# Patient Record
Sex: FEMALE | Race: WHITE | Employment: UNEMPLOYED | URBAN - METROPOLITAN AREA
[De-identification: names, ages, dates, MRNs, and addresses within clinical notes are randomized per-mention and may not be internally consistent; named-entity substitution may affect disease eponyms.]

---

## 2020-12-11 ENCOUNTER — HOSPITAL ENCOUNTER (OUTPATIENT)
Dept: PREADMISSION TESTING | Age: 75
Discharge: HOME | End: 2020-12-15
Payer: MEDICARE

## 2020-12-11 ENCOUNTER — OFFICE VISIT (OUTPATIENT)
Dept: PRIMARY CARE CLINIC | Age: 75
End: 2020-12-11
Payer: MEDICARE

## 2020-12-11 LAB
ABO/RH: NORMAL
ANION GAP SERPL CALCULATED.3IONS-SCNC: 13 MMOL/L (ref 3–16)
ANTIBODY SCREEN: NORMAL
APTT: 31.2 SEC (ref 24.2–36.2)
BASOPHILS ABSOLUTE: 0 K/UL (ref 0–0.2)
BASOPHILS RELATIVE PERCENT: 0.3 %
BILIRUBIN URINE: NEGATIVE
BLOOD, URINE: ABNORMAL
BUN BLDV-MCNC: 22 MG/DL (ref 7–20)
CALCIUM SERPL-MCNC: 10.3 MG/DL (ref 8.3–10.6)
CHLORIDE BLD-SCNC: 102 MMOL/L (ref 99–110)
CLARITY: ABNORMAL
CO2: 24 MMOL/L (ref 21–32)
COLOR: YELLOW
CREAT SERPL-MCNC: 1.8 MG/DL (ref 0.6–1.2)
EKG ATRIAL RATE: 78 BPM
EKG DIAGNOSIS: NORMAL
EKG P AXIS: 47 DEGREES
EKG P-R INTERVAL: 184 MS
EKG Q-T INTERVAL: 362 MS
EKG QRS DURATION: 70 MS
EKG QTC CALCULATION (BAZETT): 412 MS
EKG R AXIS: 66 DEGREES
EKG T AXIS: 59 DEGREES
EKG VENTRICULAR RATE: 78 BPM
EOSINOPHILS ABSOLUTE: 0 K/UL (ref 0–0.6)
EOSINOPHILS RELATIVE PERCENT: 0.7 %
EPITHELIAL CELLS, UA: 1 /HPF (ref 0–5)
GFR AFRICAN AMERICAN: 33
GFR NON-AFRICAN AMERICAN: 27
GLUCOSE BLD-MCNC: 109 MG/DL (ref 70–99)
GLUCOSE URINE: NEGATIVE MG/DL
HCT VFR BLD CALC: 36.6 % (ref 36–48)
HEMOGLOBIN: 11.8 G/DL (ref 12–16)
HYALINE CASTS: 1 /LPF (ref 0–8)
INR BLD: 0.97 (ref 0.86–1.14)
KETONES, URINE: NEGATIVE MG/DL
LEUKOCYTE ESTERASE, URINE: ABNORMAL
LYMPHOCYTES ABSOLUTE: 0.5 K/UL (ref 1–5.1)
LYMPHOCYTES RELATIVE PERCENT: 8.5 %
MCH RBC QN AUTO: 28.1 PG (ref 26–34)
MCHC RBC AUTO-ENTMCNC: 32.3 G/DL (ref 31–36)
MCV RBC AUTO: 87.1 FL (ref 80–100)
MICROSCOPIC EXAMINATION: YES
MONOCYTES ABSOLUTE: 0.3 K/UL (ref 0–1.3)
MONOCYTES RELATIVE PERCENT: 4.6 %
NEUTROPHILS ABSOLUTE: 5.2 K/UL (ref 1.7–7.7)
NEUTROPHILS RELATIVE PERCENT: 85.9 %
NITRITE, URINE: NEGATIVE
PDW BLD-RTO: 13.7 % (ref 12.4–15.4)
PH UA: 7.5 (ref 5–8)
PLATELET # BLD: 212 K/UL (ref 135–450)
PMV BLD AUTO: 8.7 FL (ref 5–10.5)
POTASSIUM SERPL-SCNC: 4.4 MMOL/L (ref 3.5–5.1)
PROTEIN UA: 30 MG/DL
PROTHROMBIN TIME: 11.2 SEC (ref 10–13.2)
RBC # BLD: 4.2 M/UL (ref 4–5.2)
RBC UA: 25 /HPF (ref 0–4)
SEDIMENTATION RATE, ERYTHROCYTE: 14 MM/HR (ref 0–30)
SODIUM BLD-SCNC: 139 MMOL/L (ref 136–145)
SPECIFIC GRAVITY UA: 1.02 (ref 1–1.03)
URINE REFLEX TO CULTURE: YES
URINE TYPE: ABNORMAL
UROBILINOGEN, URINE: 0.2 E.U./DL
WBC # BLD: 6.1 K/UL (ref 4–11)
WBC UA: 351 /HPF (ref 0–5)

## 2020-12-11 PROCEDURE — 85730 THROMBOPLASTIN TIME PARTIAL: CPT

## 2020-12-11 PROCEDURE — G8428 CUR MEDS NOT DOCUMENT: HCPCS | Performed by: NURSE PRACTITIONER

## 2020-12-11 PROCEDURE — 99211 OFF/OP EST MAY X REQ PHY/QHP: CPT | Performed by: NURSE PRACTITIONER

## 2020-12-11 PROCEDURE — 87641 MR-STAPH DNA AMP PROBE: CPT

## 2020-12-11 PROCEDURE — 93010 ELECTROCARDIOGRAM REPORT: CPT | Performed by: INTERNAL MEDICINE

## 2020-12-11 PROCEDURE — G8421 BMI NOT CALCULATED: HCPCS | Performed by: NURSE PRACTITIONER

## 2020-12-11 PROCEDURE — 86850 RBC ANTIBODY SCREEN: CPT

## 2020-12-11 PROCEDURE — 93005 ELECTROCARDIOGRAM TRACING: CPT | Performed by: NEUROLOGICAL SURGERY

## 2020-12-11 PROCEDURE — 80048 BASIC METABOLIC PNL TOTAL CA: CPT

## 2020-12-11 PROCEDURE — 86901 BLOOD TYPING SEROLOGIC RH(D): CPT

## 2020-12-11 PROCEDURE — 85610 PROTHROMBIN TIME: CPT

## 2020-12-11 PROCEDURE — 87086 URINE CULTURE/COLONY COUNT: CPT

## 2020-12-11 PROCEDURE — 85025 COMPLETE CBC W/AUTO DIFF WBC: CPT

## 2020-12-11 PROCEDURE — 81001 URINALYSIS AUTO W/SCOPE: CPT

## 2020-12-11 PROCEDURE — 85652 RBC SED RATE AUTOMATED: CPT

## 2020-12-11 PROCEDURE — 86900 BLOOD TYPING SEROLOGIC ABO: CPT

## 2020-12-11 NOTE — PROGRESS NOTES
Patient presented to [facility] drive up clinic for preop testing. Patient was swabbed and given information advising them to remain isolated until procedure date.

## 2020-12-12 LAB
MRSA SCREEN RT-PCR: NORMAL
SARS-COV-2, NAA: NOT DETECTED
URINE CULTURE, ROUTINE: NORMAL

## 2021-01-08 ENCOUNTER — HOSPITAL ENCOUNTER (OUTPATIENT)
Dept: PREADMISSION TESTING | Age: 76
Discharge: HOME | End: 2021-01-12
Payer: MEDICARE

## 2021-01-08 ENCOUNTER — OFFICE VISIT (OUTPATIENT)
Dept: PRIMARY CARE CLINIC | Age: 76
End: 2021-01-08
Payer: MEDICARE

## 2021-01-08 DIAGNOSIS — Z01.812 PRE-PROCEDURAL LABORATORY EXAMINATIONS: Primary | ICD-10-CM

## 2021-01-08 LAB
ABO/RH: NORMAL
ANION GAP SERPL CALCULATED.3IONS-SCNC: 13 MMOL/L (ref 3–16)
ANTIBODY SCREEN: NORMAL
APTT: 29.6 SEC (ref 24.2–36.2)
BACTERIA: ABNORMAL /HPF
BASOPHILS ABSOLUTE: 0 K/UL (ref 0–0.2)
BASOPHILS RELATIVE PERCENT: 0.3 %
BILIRUBIN URINE: NEGATIVE
BLOOD, URINE: ABNORMAL
BUN BLDV-MCNC: 24 MG/DL (ref 7–20)
CALCIUM SERPL-MCNC: 10.5 MG/DL (ref 8.3–10.6)
CHLORIDE BLD-SCNC: 102 MMOL/L (ref 99–110)
CLARITY: ABNORMAL
CO2: 26 MMOL/L (ref 21–32)
COLOR: YELLOW
CREAT SERPL-MCNC: 1.6 MG/DL (ref 0.6–1.2)
EOSINOPHILS ABSOLUTE: 0 K/UL (ref 0–0.6)
EOSINOPHILS RELATIVE PERCENT: 0.8 %
EPITHELIAL CELLS, UA: 4 /HPF (ref 0–5)
GFR AFRICAN AMERICAN: 38
GFR NON-AFRICAN AMERICAN: 31
GLUCOSE BLD-MCNC: 98 MG/DL (ref 70–99)
GLUCOSE URINE: NEGATIVE MG/DL
HCT VFR BLD CALC: 34.6 % (ref 36–48)
HEMOGLOBIN: 11.3 G/DL (ref 12–16)
HYALINE CASTS: 1 /LPF (ref 0–8)
INR BLD: 0.94 (ref 0.86–1.14)
KETONES, URINE: NEGATIVE MG/DL
LEUKOCYTE ESTERASE, URINE: ABNORMAL
LYMPHOCYTES ABSOLUTE: 0.5 K/UL (ref 1–5.1)
LYMPHOCYTES RELATIVE PERCENT: 8.3 %
MCH RBC QN AUTO: 28.2 PG (ref 26–34)
MCHC RBC AUTO-ENTMCNC: 32.7 G/DL (ref 31–36)
MCV RBC AUTO: 86.3 FL (ref 80–100)
MICROSCOPIC EXAMINATION: YES
MONOCYTES ABSOLUTE: 0.3 K/UL (ref 0–1.3)
MONOCYTES RELATIVE PERCENT: 5.4 %
MRSA SCREEN RT-PCR: NORMAL
NEUTROPHILS ABSOLUTE: 5 K/UL (ref 1.7–7.7)
NEUTROPHILS RELATIVE PERCENT: 85.2 %
NITRITE, URINE: NEGATIVE
PDW BLD-RTO: 13.7 % (ref 12.4–15.4)
PH UA: 6.5 (ref 5–8)
PLATELET # BLD: 227 K/UL (ref 135–450)
PMV BLD AUTO: 8.3 FL (ref 5–10.5)
POTASSIUM SERPL-SCNC: 4.5 MMOL/L (ref 3.5–5.1)
PROTEIN UA: 30 MG/DL
PROTHROMBIN TIME: 10.9 SEC (ref 10–13.2)
RBC # BLD: 4.01 M/UL (ref 4–5.2)
RBC UA: 8 /HPF (ref 0–4)
SEDIMENTATION RATE, ERYTHROCYTE: 16 MM/HR (ref 0–30)
SODIUM BLD-SCNC: 141 MMOL/L (ref 136–145)
SPECIFIC GRAVITY UA: 1.01 (ref 1–1.03)
URINE REFLEX TO CULTURE: YES
URINE TYPE: ABNORMAL
UROBILINOGEN, URINE: 0.2 E.U./DL
WBC # BLD: 5.8 K/UL (ref 4–11)
WBC UA: 520 /HPF (ref 0–5)

## 2021-01-08 PROCEDURE — G8428 CUR MEDS NOT DOCUMENT: HCPCS | Performed by: NURSE PRACTITIONER

## 2021-01-08 PROCEDURE — 85730 THROMBOPLASTIN TIME PARTIAL: CPT

## 2021-01-08 PROCEDURE — G8421 BMI NOT CALCULATED: HCPCS | Performed by: NURSE PRACTITIONER

## 2021-01-08 PROCEDURE — 87086 URINE CULTURE/COLONY COUNT: CPT

## 2021-01-08 PROCEDURE — 87641 MR-STAPH DNA AMP PROBE: CPT

## 2021-01-08 PROCEDURE — 86850 RBC ANTIBODY SCREEN: CPT

## 2021-01-08 PROCEDURE — 85610 PROTHROMBIN TIME: CPT

## 2021-01-08 PROCEDURE — 80048 BASIC METABOLIC PNL TOTAL CA: CPT

## 2021-01-08 PROCEDURE — 86901 BLOOD TYPING SEROLOGIC RH(D): CPT

## 2021-01-08 PROCEDURE — 86900 BLOOD TYPING SEROLOGIC ABO: CPT

## 2021-01-08 PROCEDURE — 85652 RBC SED RATE AUTOMATED: CPT

## 2021-01-08 PROCEDURE — 85025 COMPLETE CBC W/AUTO DIFF WBC: CPT

## 2021-01-08 PROCEDURE — 99211 OFF/OP EST MAY X REQ PHY/QHP: CPT | Performed by: NURSE PRACTITIONER

## 2021-01-08 PROCEDURE — 81001 URINALYSIS AUTO W/SCOPE: CPT

## 2021-01-09 LAB
SARS-COV-2: NOT DETECTED
URINE CULTURE, ROUTINE: NORMAL

## 2021-01-12 RX ORDER — QUETIAPINE FUMARATE 25 MG/1
25 TABLET, FILM COATED ORAL DAILY
COMMUNITY

## 2021-01-12 RX ORDER — OMEGA-3 FATTY ACIDS CAP DELAYED RELEASE 1000 MG 1000 MG
3000 CAPSULE DELAYED RELEASE ORAL 4 TIMES DAILY
Status: ON HOLD | COMMUNITY
End: 2021-01-17 | Stop reason: HOSPADM

## 2021-01-12 RX ORDER — ATORVASTATIN CALCIUM 10 MG/1
10 TABLET, FILM COATED ORAL DAILY
COMMUNITY

## 2021-01-12 RX ORDER — FAMOTIDINE 20 MG/1
20 TABLET, FILM COATED ORAL 2 TIMES DAILY
COMMUNITY

## 2021-01-12 RX ORDER — FLUOXETINE 10 MG/1
10 TABLET, FILM COATED ORAL 3 TIMES DAILY
COMMUNITY

## 2021-01-12 RX ORDER — TROSPIUM CHLORIDE 20 MG/1
20 TABLET, FILM COATED ORAL 2 TIMES DAILY
COMMUNITY

## 2021-01-12 RX ORDER — MYCOPHENOLATE MOFETIL 250 MG/1
250 CAPSULE ORAL 4 TIMES DAILY
COMMUNITY

## 2021-01-12 RX ORDER — CYANOCOBALAMIN (VITAMIN B-12) 1000 MCG
1000 TABLET, EXTENDED RELEASE ORAL WEEKLY
COMMUNITY

## 2021-01-12 NOTE — PROGRESS NOTES
Preoperative Screening for Elective Surgery/Invasive Procedures While COVID-19 present in the community    ? Have you tested positive or have been told to self-isolate for COVID-19 like symptoms within the past 28 days? Done on 1/8/21  ? Do you currently have any of the following symptoms? NO  o Fever >100.0 F or 99.9 F in immunocompromised patients? o New onset cough, shortness of breath or difficulty breathing?  o New onset sore throat, myalgia (muscle aches and pains), headache, loss of taste/smell or diarrhea? ? Have you had a potential exposure to COVID-19 within the past 14 days by: NO  o Close contact with a confirmed case? o Close contact with a healthcare worker,  or essential infrastructure worker (grocery store, [employer], gas station, public utilities or transportation)? o Do you reside in a congregate setting such as; skilled nursing facility, adult home, correctional facility, homeless shelter or other institutional setting?  o Have you had recent travel to a known COVID-19 hotspot? Indicate if the patient has a positive screen by answering yes to one or more of the above questions. Patients who test positive or screen positive prior to surgery or on the day of surgery should be evaluated in conjunction with the surgeon/proceduralist/anesthesiologist to determine the urgency of the procedure.

## 2021-01-12 NOTE — PROGRESS NOTES
C-Difficile admission screening and protocol:     * Admitted with diarrhea? YES____    NO_X____     *Prior history of C-Diff. In last 3 months? YES____   NO_X___     *Antibiotic use in the past 6-8 weeks? NO____X__YES______                 If yes which  ANTIBIOTIC AND REASON______     *Prior hospitalization or nursing home in the last month?  YES____   NO_X__

## 2021-01-12 NOTE — PROGRESS NOTES
[facility] time____0830________        Surgery time___0955_________    Take the following medications with a sip of water: Follow your MD/Surgeons pre-procedure instructions regarding your medications    Do not eat or drink anything after 12:00 midnight prior to your surgery. This includes water chewing gum, mints and ice chips. You may brush your teeth and gargle the morning of your surgery, but do not swallow the water     Please see your family doctor/pediatrician for a history and physical and/or concerning medications. Bring any test results/reports from your physicians office. If you are under the care of a heart doctor or specialist doctor, please be aware that you may be asked to them for clearance    You may be asked to stop blood thinners such as Coumadin, Plavix, Fragmin, Lovenox, etc., or any anti-inflammatories such as:  Aspirin, Ibuprofen, Advil, Naproxen prior to your surgery. We also ask that you stop any OTC medications such as fish oil, vitamin E, glucosamine, garlic, Multivitamins, COQ 10, etc.    We ask that you do not smoke 24 hours prior to surgery  We ask that you do not  drink any alcoholic beverages 24 hours prior to surgery     You must make arrangements for a responsible adult to take you home after your surgery. For your safety you will not be allowed to leave alone or drive yourself home. Your surgery will be cancelled if you do not have a ride home. Also for your safety, it is strongly suggested that someone stay with you the first 24 hours after your surgery. A parent or legal guardian must accompany a child scheduled for surgery and plan to stay at the hospital until the child is discharged. Please do not bring other children with you. For your comfort, please wear simple loose fitting clothing to the hospital.  Please do not bring valuables.     Do not wear any make-up or nail polish on your fingers or toes For your safety, please do not wear any jewelry or body piercing's on the day of surgery. All jewelry must be removed. If you have dentures, they will be removed before going to operating room. For your convenience, we will provide you with a container. If you wear contact lenses or glasses, they will be removed, please bring a case for them. If you have a living will and a durable power of  for healthcare, please bring in a copy. As part of our patient safety program to minimize surgical site infections, we ask you to do the following:    · Please notify your surgeon if you develop any illness between         now and the  day of your surgery. · This includes a cough, cold, fever, sore throat, nausea,         or vomiting, and diarrhea, etc.  ·  Please notify your surgeon if you experience dizziness, shortness         of breath or blurred vision between now and the time of your surgery. Do not shave your operative site 96 hours prior to surgery. For face and neck surgery, men may use an electric razor 48 hours   prior to surgery. You may shower the night before surgery or the morning of   your surgery with an antibacterial soap. You will need to bring a photo ID and insurance card    [facility] has an onsite pharmacy, would you like to utilize our pharmacy     If you will be staying overnight and use a C-pap machine, please bring   your C-pap to hospital     Our goal is to provide you with excellent care, therefore, visitors will be limited to two(2) in the room at a time so that we may focus on providing this care for you. Please contact pre-admission testing if you have any further questions. [facility] phone number:  [phone] PAT fax number:  [phone]  Please note these are generalized instructions for all surgical cases, you may be provided with more specific instructions according to your surgery.

## 2021-01-13 ENCOUNTER — ANESTHESIA EVENT (OUTPATIENT)
Dept: OPERATING ROOM | Age: 76
DRG: 472 | End: 2021-01-13
Payer: MEDICARE

## 2021-01-14 ENCOUNTER — HOSPITAL ENCOUNTER (INPATIENT)
Age: 76
LOS: 3 days | Discharge: HOME | DRG: 472 | End: 2021-01-17
Attending: NEUROLOGICAL SURGERY | Admitting: NEUROLOGICAL SURGERY
Payer: MEDICARE

## 2021-01-14 ENCOUNTER — APPOINTMENT (OUTPATIENT)
Dept: GENERAL RADIOLOGY | Age: 76
DRG: 472 | End: 2021-01-14
Attending: NEUROLOGICAL SURGERY
Payer: MEDICARE

## 2021-01-14 ENCOUNTER — ANESTHESIA (OUTPATIENT)
Dept: OPERATING ROOM | Age: 76
DRG: 472 | End: 2021-01-14
Payer: MEDICARE

## 2021-01-14 VITALS
TEMPERATURE: 96.6 F | SYSTOLIC BLOOD PRESSURE: 178 MMHG | RESPIRATION RATE: 1 BRPM | DIASTOLIC BLOOD PRESSURE: 83 MMHG | OXYGEN SATURATION: 100 %

## 2021-01-14 DIAGNOSIS — M50.00 CERVICAL DISC DISEASE WITH MYELOPATHY: Primary | ICD-10-CM

## 2021-01-14 LAB
ABO/RH: NORMAL
ANTIBODY SCREEN: NORMAL
GLUCOSE BLD-MCNC: 107 MG/DL (ref 70–99)
GLUCOSE BLD-MCNC: 146 MG/DL (ref 70–99)
PERFORMED ON: ABNORMAL
PERFORMED ON: ABNORMAL

## 2021-01-14 PROCEDURE — 3700000000 HC ANESTHESIA ATTENDED CARE: Performed by: NEUROLOGICAL SURGERY

## 2021-01-14 PROCEDURE — 2580000003 HC RX 258: Performed by: NEUROLOGICAL SURGERY

## 2021-01-14 PROCEDURE — 2500000003 HC RX 250 WO HCPCS

## 2021-01-14 PROCEDURE — 2709999900 HC NON-CHARGEABLE SUPPLY: Performed by: NEUROLOGICAL SURGERY

## 2021-01-14 PROCEDURE — 2580000003 HC RX 258: Performed by: ANESTHESIOLOGY

## 2021-01-14 PROCEDURE — 01N10ZZ RELEASE CERVICAL NERVE, OPEN APPROACH: ICD-10-PCS | Performed by: NEUROLOGICAL SURGERY

## 2021-01-14 PROCEDURE — 6360000002 HC RX W HCPCS: Performed by: NEUROLOGICAL SURGERY

## 2021-01-14 PROCEDURE — 7100000001 HC PACU RECOVERY - ADDTL 15 MIN: Performed by: NEUROLOGICAL SURGERY

## 2021-01-14 PROCEDURE — 3600000004 HC SURGERY LEVEL 4 BASE: Performed by: NEUROLOGICAL SURGERY

## 2021-01-14 PROCEDURE — 72020 X-RAY EXAM OF SPINE 1 VIEW: CPT

## 2021-01-14 PROCEDURE — 6360000002 HC RX W HCPCS: Performed by: NURSE ANESTHETIST, CERTIFIED REGISTERED

## 2021-01-14 PROCEDURE — 3700000001 HC ADD 15 MINUTES (ANESTHESIA): Performed by: NEUROLOGICAL SURGERY

## 2021-01-14 PROCEDURE — 3209999900 FLUORO FOR SURGICAL PROCEDURES

## 2021-01-14 PROCEDURE — 0RG2071 FUSION OF 2 OR MORE CERVICAL VERTEBRAL JOINTS WITH AUTOLOGOUS TISSUE SUBSTITUTE, POSTERIOR APPROACH, POSTERIOR COLUMN, OPEN APPROACH: ICD-10-PCS | Performed by: NEUROLOGICAL SURGERY

## 2021-01-14 PROCEDURE — 2700000000 HC OXYGEN THERAPY PER DAY

## 2021-01-14 PROCEDURE — 6370000000 HC RX 637 (ALT 250 FOR IP): Performed by: NEUROLOGICAL SURGERY

## 2021-01-14 PROCEDURE — 3600000014 HC SURGERY LEVEL 4 ADDTL 15MIN: Performed by: NEUROLOGICAL SURGERY

## 2021-01-14 PROCEDURE — 1200000000 HC SEMI PRIVATE

## 2021-01-14 PROCEDURE — 2720000010 HC SURG SUPPLY STERILE: Performed by: NEUROLOGICAL SURGERY

## 2021-01-14 PROCEDURE — 7100000000 HC PACU RECOVERY - FIRST 15 MIN: Performed by: NEUROLOGICAL SURGERY

## 2021-01-14 PROCEDURE — 93005 ELECTROCARDIOGRAM TRACING: CPT | Performed by: NEUROLOGICAL SURGERY

## 2021-01-14 PROCEDURE — 2580000003 HC RX 258: Performed by: NURSE ANESTHETIST, CERTIFIED REGISTERED

## 2021-01-14 PROCEDURE — 86900 BLOOD TYPING SEROLOGIC ABO: CPT

## 2021-01-14 PROCEDURE — 2500000003 HC RX 250 WO HCPCS: Performed by: NURSE ANESTHETIST, CERTIFIED REGISTERED

## 2021-01-14 PROCEDURE — 86901 BLOOD TYPING SEROLOGIC RH(D): CPT

## 2021-01-14 PROCEDURE — 36415 COLL VENOUS BLD VENIPUNCTURE: CPT

## 2021-01-14 PROCEDURE — C9359 IMPLNT,BON VOID FILLER-PUTTY: HCPCS | Performed by: NEUROLOGICAL SURGERY

## 2021-01-14 PROCEDURE — 94760 N-INVAS EAR/PLS OXIMETRY 1: CPT

## 2021-01-14 PROCEDURE — 2500000003 HC RX 250 WO HCPCS: Performed by: NEUROLOGICAL SURGERY

## 2021-01-14 PROCEDURE — 86850 RBC ANTIBODY SCREEN: CPT

## 2021-01-14 PROCEDURE — 00NW0ZZ RELEASE CERVICAL SPINAL CORD, OPEN APPROACH: ICD-10-PCS | Performed by: NEUROLOGICAL SURGERY

## 2021-01-14 PROCEDURE — C1713 ANCHOR/SCREW BN/BN,TIS/BN: HCPCS | Performed by: NEUROLOGICAL SURGERY

## 2021-01-14 PROCEDURE — 6360000002 HC RX W HCPCS: Performed by: ANESTHESIOLOGY

## 2021-01-14 DEVICE — DBX PUTTY, 5CC
Type: IMPLANTABLE DEVICE | Site: BACK | Status: FUNCTIONAL
Brand: DBX®

## 2021-01-14 DEVICE — SET SCR SPNL TI ALLY OCCIPITOCERVICOTHORACIC STREAMLINE OCT: Type: IMPLANTABLE DEVICE | Site: BACK | Status: FUNCTIONAL

## 2021-01-14 DEVICE — ROD SPNL 60 MM: Type: IMPLANTABLE DEVICE | Site: BACK | Status: FUNCTIONAL

## 2021-01-14 DEVICE — SCREW SPNL L12MM DIA3.5MM OCCIPITOCERVICOTHORACIC TI POLYAX: Type: IMPLANTABLE DEVICE | Site: BACK | Status: FUNCTIONAL

## 2021-01-14 RX ORDER — SODIUM CHLORIDE 0.9 % (FLUSH) 0.9 %
10 SYRINGE (ML) INJECTION PRN
Status: DISCONTINUED | OUTPATIENT
Start: 2021-01-14 | End: 2021-01-14 | Stop reason: HOSPADM

## 2021-01-14 RX ORDER — SODIUM CHLORIDE 0.9 % (FLUSH) 0.9 %
10 SYRINGE (ML) INJECTION EVERY 12 HOURS SCHEDULED
Status: DISCONTINUED | OUTPATIENT
Start: 2021-01-14 | End: 2021-01-14 | Stop reason: HOSPADM

## 2021-01-14 RX ORDER — MAGNESIUM HYDROXIDE/ALUMINUM HYDROXICE/SIMETHICONE 120; 1200; 1200 MG/30ML; MG/30ML; MG/30ML
15 SUSPENSION ORAL EVERY 6 HOURS PRN
Status: DISCONTINUED | OUTPATIENT
Start: 2021-01-14 | End: 2021-01-17 | Stop reason: HOSPADM

## 2021-01-14 RX ORDER — OXYCODONE HYDROCHLORIDE 10 MG/1
10 TABLET ORAL PRN
Status: DISCONTINUED | OUTPATIENT
Start: 2021-01-14 | End: 2021-01-14 | Stop reason: HOSPADM

## 2021-01-14 RX ORDER — FAMOTIDINE 20 MG/1
20 TABLET, FILM COATED ORAL NIGHTLY
Status: DISCONTINUED | OUTPATIENT
Start: 2021-01-14 | End: 2021-01-17 | Stop reason: HOSPADM

## 2021-01-14 RX ORDER — MORPHINE SULFATE 2 MG/ML
2 INJECTION, SOLUTION INTRAMUSCULAR; INTRAVENOUS EVERY 5 MIN PRN
Status: DISCONTINUED | OUTPATIENT
Start: 2021-01-14 | End: 2021-01-14 | Stop reason: HOSPADM

## 2021-01-14 RX ORDER — FAMOTIDINE 20 MG/1
20 TABLET, FILM COATED ORAL 2 TIMES DAILY
Status: DISCONTINUED | OUTPATIENT
Start: 2021-01-14 | End: 2021-01-14

## 2021-01-14 RX ORDER — OXYCODONE HYDROCHLORIDE 5 MG/1
5 TABLET ORAL PRN
Status: DISCONTINUED | OUTPATIENT
Start: 2021-01-14 | End: 2021-01-14 | Stop reason: HOSPADM

## 2021-01-14 RX ORDER — ATORVASTATIN CALCIUM 10 MG/1
10 TABLET, FILM COATED ORAL NIGHTLY
Status: DISCONTINUED | OUTPATIENT
Start: 2021-01-14 | End: 2021-01-17 | Stop reason: HOSPADM

## 2021-01-14 RX ORDER — TROSPIUM CHLORIDE 20 MG/1
20 TABLET, FILM COATED ORAL
Status: DISCONTINUED | OUTPATIENT
Start: 2021-01-14 | End: 2021-01-17 | Stop reason: HOSPADM

## 2021-01-14 RX ORDER — SODIUM CHLORIDE 0.9 % (FLUSH) 0.9 %
10 SYRINGE (ML) INJECTION EVERY 12 HOURS SCHEDULED
Status: DISCONTINUED | OUTPATIENT
Start: 2021-01-14 | End: 2021-01-17 | Stop reason: HOSPADM

## 2021-01-14 RX ORDER — SODIUM CHLORIDE 9 MG/ML
INJECTION, SOLUTION INTRAVENOUS CONTINUOUS
Status: DISCONTINUED | OUTPATIENT
Start: 2021-01-14 | End: 2021-01-14

## 2021-01-14 RX ORDER — SODIUM CHLORIDE 0.9 % (FLUSH) 0.9 %
10 SYRINGE (ML) INJECTION PRN
Status: DISCONTINUED | OUTPATIENT
Start: 2021-01-14 | End: 2021-01-17 | Stop reason: HOSPADM

## 2021-01-14 RX ORDER — QUETIAPINE FUMARATE 25 MG/1
25 TABLET, FILM COATED ORAL NIGHTLY
Status: DISCONTINUED | OUTPATIENT
Start: 2021-01-14 | End: 2021-01-17 | Stop reason: HOSPADM

## 2021-01-14 RX ORDER — MYCOPHENOLATE MOFETIL 250 MG/1
250 CAPSULE ORAL 4 TIMES DAILY
Status: DISCONTINUED | OUTPATIENT
Start: 2021-01-14 | End: 2021-01-17 | Stop reason: HOSPADM

## 2021-01-14 RX ORDER — GLYCOPYRROLATE 0.2 MG/ML
INJECTION INTRAMUSCULAR; INTRAVENOUS PRN
Status: DISCONTINUED | OUTPATIENT
Start: 2021-01-14 | End: 2021-01-14 | Stop reason: SDUPTHER

## 2021-01-14 RX ORDER — FENTANYL CITRATE 50 UG/ML
INJECTION, SOLUTION INTRAMUSCULAR; INTRAVENOUS PRN
Status: DISCONTINUED | OUTPATIENT
Start: 2021-01-14 | End: 2021-01-14 | Stop reason: SDUPTHER

## 2021-01-14 RX ORDER — ONDANSETRON 2 MG/ML
4 INJECTION INTRAMUSCULAR; INTRAVENOUS
Status: DISCONTINUED | OUTPATIENT
Start: 2021-01-14 | End: 2021-01-14 | Stop reason: HOSPADM

## 2021-01-14 RX ORDER — OXYCODONE HYDROCHLORIDE 10 MG/1
10 TABLET ORAL EVERY 4 HOURS PRN
Status: DISCONTINUED | OUTPATIENT
Start: 2021-01-14 | End: 2021-01-17 | Stop reason: HOSPADM

## 2021-01-14 RX ORDER — PROPOFOL 10 MG/ML
INJECTION, EMULSION INTRAVENOUS PRN
Status: DISCONTINUED | OUTPATIENT
Start: 2021-01-14 | End: 2021-01-14 | Stop reason: SDUPTHER

## 2021-01-14 RX ORDER — LIDOCAINE HYDROCHLORIDE 20 MG/ML
INJECTION, SOLUTION EPIDURAL; INFILTRATION; INTRACAUDAL; PERINEURAL PRN
Status: DISCONTINUED | OUTPATIENT
Start: 2021-01-14 | End: 2021-01-14 | Stop reason: SDUPTHER

## 2021-01-14 RX ORDER — PROMETHAZINE HYDROCHLORIDE 25 MG/1
12.5 TABLET ORAL EVERY 6 HOURS PRN
Status: DISCONTINUED | OUTPATIENT
Start: 2021-01-14 | End: 2021-01-17 | Stop reason: HOSPADM

## 2021-01-14 RX ORDER — FLUOXETINE 10 MG/1
10 TABLET, FILM COATED ORAL 3 TIMES DAILY
Status: DISCONTINUED | OUTPATIENT
Start: 2021-01-14 | End: 2021-01-17 | Stop reason: HOSPADM

## 2021-01-14 RX ORDER — FENTANYL CITRATE 50 UG/ML
25 INJECTION, SOLUTION INTRAMUSCULAR; INTRAVENOUS EVERY 5 MIN PRN
Status: DISCONTINUED | OUTPATIENT
Start: 2021-01-14 | End: 2021-01-14 | Stop reason: HOSPADM

## 2021-01-14 RX ORDER — OXYCODONE HYDROCHLORIDE 5 MG/1
5 TABLET ORAL EVERY 4 HOURS PRN
Status: DISCONTINUED | OUTPATIENT
Start: 2021-01-14 | End: 2021-01-17 | Stop reason: HOSPADM

## 2021-01-14 RX ORDER — LIDOCAINE HYDROCHLORIDE AND EPINEPHRINE 10; 10 MG/ML; UG/ML
INJECTION, SOLUTION INFILTRATION; PERINEURAL
Status: COMPLETED | OUTPATIENT
Start: 2021-01-14 | End: 2021-01-14

## 2021-01-14 RX ORDER — ONDANSETRON 2 MG/ML
4 INJECTION INTRAMUSCULAR; INTRAVENOUS EVERY 6 HOURS PRN
Status: DISCONTINUED | OUTPATIENT
Start: 2021-01-14 | End: 2021-01-17 | Stop reason: HOSPADM

## 2021-01-14 RX ORDER — DEXAMETHASONE SODIUM PHOSPHATE 4 MG/ML
INJECTION, SOLUTION INTRA-ARTICULAR; INTRALESIONAL; INTRAMUSCULAR; INTRAVENOUS; SOFT TISSUE PRN
Status: DISCONTINUED | OUTPATIENT
Start: 2021-01-14 | End: 2021-01-14 | Stop reason: SDUPTHER

## 2021-01-14 RX ORDER — ONDANSETRON 2 MG/ML
INJECTION INTRAMUSCULAR; INTRAVENOUS PRN
Status: DISCONTINUED | OUTPATIENT
Start: 2021-01-14 | End: 2021-01-14 | Stop reason: SDUPTHER

## 2021-01-14 RX ORDER — METHOCARBAMOL 750 MG/1
750 TABLET, FILM COATED ORAL 4 TIMES DAILY
Status: DISCONTINUED | OUTPATIENT
Start: 2021-01-14 | End: 2021-01-17 | Stop reason: HOSPADM

## 2021-01-14 RX ORDER — SODIUM CHLORIDE 9 MG/ML
INJECTION, SOLUTION INTRAVENOUS CONTINUOUS
Status: DISCONTINUED | OUTPATIENT
Start: 2021-01-14 | End: 2021-01-17 | Stop reason: HOSPADM

## 2021-01-14 RX ORDER — FENTANYL CITRATE 50 UG/ML
50 INJECTION, SOLUTION INTRAMUSCULAR; INTRAVENOUS EVERY 5 MIN PRN
Status: DISCONTINUED | OUTPATIENT
Start: 2021-01-14 | End: 2021-01-14 | Stop reason: HOSPADM

## 2021-01-14 RX ORDER — MIDAZOLAM HYDROCHLORIDE 1 MG/ML
INJECTION INTRAMUSCULAR; INTRAVENOUS PRN
Status: DISCONTINUED | OUTPATIENT
Start: 2021-01-14 | End: 2021-01-14 | Stop reason: SDUPTHER

## 2021-01-14 RX ORDER — ROCURONIUM BROMIDE 10 MG/ML
INJECTION, SOLUTION INTRAVENOUS PRN
Status: DISCONTINUED | OUTPATIENT
Start: 2021-01-14 | End: 2021-01-14 | Stop reason: SDUPTHER

## 2021-01-14 RX ORDER — MORPHINE SULFATE 2 MG/ML
1 INJECTION, SOLUTION INTRAMUSCULAR; INTRAVENOUS EVERY 5 MIN PRN
Status: DISCONTINUED | OUTPATIENT
Start: 2021-01-14 | End: 2021-01-14 | Stop reason: HOSPADM

## 2021-01-14 RX ORDER — MEPERIDINE HYDROCHLORIDE 25 MG/ML
12.5 INJECTION INTRAMUSCULAR; INTRAVENOUS; SUBCUTANEOUS EVERY 5 MIN PRN
Status: DISCONTINUED | OUTPATIENT
Start: 2021-01-14 | End: 2021-01-14 | Stop reason: HOSPADM

## 2021-01-14 RX ORDER — ACETAMINOPHEN 325 MG/1
650 TABLET ORAL EVERY 4 HOURS PRN
Status: DISCONTINUED | OUTPATIENT
Start: 2021-01-14 | End: 2021-01-17 | Stop reason: HOSPADM

## 2021-01-14 RX ADMIN — FLUOXETINE HYDROCHLORIDE 10 MG: 10 TABLET ORAL at 21:12

## 2021-01-14 RX ADMIN — ATORVASTATIN CALCIUM 10 MG: 10 TABLET, FILM COATED ORAL at 21:12

## 2021-01-14 RX ADMIN — PHENYLEPHRINE HYDROCHLORIDE 50 MCG: 10 INJECTION INTRAVENOUS at 11:59

## 2021-01-14 RX ADMIN — ROCURONIUM BROMIDE 20 MG: 10 INJECTION INTRAVENOUS at 12:31

## 2021-01-14 RX ADMIN — PHENYLEPHRINE HYDROCHLORIDE 100 MCG: 10 INJECTION INTRAVENOUS at 11:12

## 2021-01-14 RX ADMIN — SUGAMMADEX 300 MG: 100 INJECTION, SOLUTION INTRAVENOUS at 12:42

## 2021-01-14 RX ADMIN — PHENYLEPHRINE HYDROCHLORIDE 50 MCG: 10 INJECTION INTRAVENOUS at 11:08

## 2021-01-14 RX ADMIN — LIDOCAINE HYDROCHLORIDE 60 MG: 20 INJECTION, SOLUTION EPIDURAL; INFILTRATION; INTRACAUDAL; PERINEURAL at 10:47

## 2021-01-14 RX ADMIN — CEFAZOLIN SODIUM 2 G: 10 INJECTION, POWDER, FOR SOLUTION INTRAVENOUS at 10:36

## 2021-01-14 RX ADMIN — PHENYLEPHRINE HYDROCHLORIDE 50 MCG: 10 INJECTION INTRAVENOUS at 11:25

## 2021-01-14 RX ADMIN — MYCOPHENOLATE MOFETIL 250 MG: 250 CAPSULE ORAL at 21:11

## 2021-01-14 RX ADMIN — HYDROMORPHONE HYDROCHLORIDE 0.5 MG: 1 INJECTION, SOLUTION INTRAMUSCULAR; INTRAVENOUS; SUBCUTANEOUS at 21:08

## 2021-01-14 RX ADMIN — TROSPIUM CHLORIDE 20 MG: 20 TABLET, FILM COATED ORAL at 17:08

## 2021-01-14 RX ADMIN — SODIUM CHLORIDE: 9 INJECTION, SOLUTION INTRAVENOUS at 09:27

## 2021-01-14 RX ADMIN — PROPOFOL 30 MG: 10 INJECTION, EMULSION INTRAVENOUS at 10:54

## 2021-01-14 RX ADMIN — GLYCOPYRROLATE 0.2 MG: 0.2 INJECTION, SOLUTION INTRAMUSCULAR; INTRAVENOUS at 10:41

## 2021-01-14 RX ADMIN — CEFAZOLIN SODIUM 1 G: 1 INJECTION, POWDER, FOR SOLUTION INTRAMUSCULAR; INTRAVENOUS at 21:11

## 2021-01-14 RX ADMIN — PROPOFOL 50 MG: 10 INJECTION, EMULSION INTRAVENOUS at 10:58

## 2021-01-14 RX ADMIN — METHOCARBAMOL TABLETS 750 MG: 750 TABLET, COATED ORAL at 21:12

## 2021-01-14 RX ADMIN — MIDAZOLAM 1 MG: 1 INJECTION INTRAMUSCULAR; INTRAVENOUS at 10:54

## 2021-01-14 RX ADMIN — ROCURONIUM BROMIDE 40 MG: 10 INJECTION INTRAVENOUS at 10:47

## 2021-01-14 RX ADMIN — PHENYLEPHRINE HYDROCHLORIDE 100 MCG: 10 INJECTION INTRAVENOUS at 11:41

## 2021-01-14 RX ADMIN — DEXAMETHASONE SODIUM PHOSPHATE 8 MG: 4 INJECTION, SOLUTION INTRAMUSCULAR; INTRAVENOUS at 10:41

## 2021-01-14 RX ADMIN — PHENYLEPHRINE HYDROCHLORIDE 50 MCG: 10 INJECTION INTRAVENOUS at 11:44

## 2021-01-14 RX ADMIN — FENTANYL CITRATE 100 MCG: 50 INJECTION INTRAMUSCULAR; INTRAVENOUS at 10:54

## 2021-01-14 RX ADMIN — MYCOPHENOLATE MOFETIL 250 MG: 250 CAPSULE ORAL at 17:08

## 2021-01-14 RX ADMIN — SODIUM CHLORIDE: 9 INJECTION, SOLUTION INTRAVENOUS at 12:49

## 2021-01-14 RX ADMIN — OXYCODONE HYDROCHLORIDE 10 MG: 10 TABLET ORAL at 23:52

## 2021-01-14 RX ADMIN — QUETIAPINE FUMARATE 25 MG: 25 TABLET ORAL at 21:12

## 2021-01-14 RX ADMIN — FENTANYL CITRATE 50 MCG: 50 INJECTION, SOLUTION INTRAMUSCULAR; INTRAVENOUS at 14:11

## 2021-01-14 RX ADMIN — ONDANSETRON 4 MG: 2 INJECTION INTRAMUSCULAR; INTRAVENOUS at 12:52

## 2021-01-14 RX ADMIN — FLUOXETINE HYDROCHLORIDE 10 MG: 10 TABLET ORAL at 17:08

## 2021-01-14 RX ADMIN — METHOCARBAMOL TABLETS 750 MG: 750 TABLET, COATED ORAL at 17:08

## 2021-01-14 RX ADMIN — MIDAZOLAM 1 MG: 1 INJECTION INTRAMUSCULAR; INTRAVENOUS at 10:38

## 2021-01-14 RX ADMIN — OXYCODONE HYDROCHLORIDE 10 MG: 10 TABLET ORAL at 17:07

## 2021-01-14 RX ADMIN — PROPOFOL 120 MG: 10 INJECTION, EMULSION INTRAVENOUS at 10:47

## 2021-01-14 RX ADMIN — ROCURONIUM BROMIDE 20 MG: 10 INJECTION INTRAVENOUS at 11:30

## 2021-01-14 RX ADMIN — SODIUM CHLORIDE: 9 INJECTION, SOLUTION INTRAVENOUS at 17:08

## 2021-01-14 RX ADMIN — FAMOTIDINE 20 MG: 20 TABLET, FILM COATED ORAL at 21:12

## 2021-01-14 RX ADMIN — FENTANYL CITRATE 50 MCG: 50 INJECTION, SOLUTION INTRAMUSCULAR; INTRAVENOUS at 13:40

## 2021-01-14 ASSESSMENT — PULMONARY FUNCTION TESTS
PIF_VALUE: 17
PIF_VALUE: 16
PIF_VALUE: 17
PIF_VALUE: 17
PIF_VALUE: 16
PIF_VALUE: 17
PIF_VALUE: 16
PIF_VALUE: 17
PIF_VALUE: 1
PIF_VALUE: 16
PIF_VALUE: 1
PIF_VALUE: 6
PIF_VALUE: 19
PIF_VALUE: 16
PIF_VALUE: 2
PIF_VALUE: 17
PIF_VALUE: 16
PIF_VALUE: 16
PIF_VALUE: 17
PIF_VALUE: 17
PIF_VALUE: 16
PIF_VALUE: 17
PIF_VALUE: 20
PIF_VALUE: 3
PIF_VALUE: 6
PIF_VALUE: 16
PIF_VALUE: 5
PIF_VALUE: 17
PIF_VALUE: 16
PIF_VALUE: 17
PIF_VALUE: 8
PIF_VALUE: 0
PIF_VALUE: 16
PIF_VALUE: 16
PIF_VALUE: 17
PIF_VALUE: 19
PIF_VALUE: 16
PIF_VALUE: 16
PIF_VALUE: 21
PIF_VALUE: 3
PIF_VALUE: 3
PIF_VALUE: 12
PIF_VALUE: 16
PIF_VALUE: 17
PIF_VALUE: 17
PIF_VALUE: 16
PIF_VALUE: 8
PIF_VALUE: 16
PIF_VALUE: 17
PIF_VALUE: 15
PIF_VALUE: 15
PIF_VALUE: 16
PIF_VALUE: 16
PIF_VALUE: 17
PIF_VALUE: 16
PIF_VALUE: 17
PIF_VALUE: 6
PIF_VALUE: 17
PIF_VALUE: 6
PIF_VALUE: 16
PIF_VALUE: 16
PIF_VALUE: 17
PIF_VALUE: 17
PIF_VALUE: 2
PIF_VALUE: 16
PIF_VALUE: 17
PIF_VALUE: 4
PIF_VALUE: 17
PIF_VALUE: 16
PIF_VALUE: 17
PIF_VALUE: 16
PIF_VALUE: 16
PIF_VALUE: 17
PIF_VALUE: 16
PIF_VALUE: 17
PIF_VALUE: 2
PIF_VALUE: 4
PIF_VALUE: 13
PIF_VALUE: 17
PIF_VALUE: 16
PIF_VALUE: 8
PIF_VALUE: 1
PIF_VALUE: 1
PIF_VALUE: 16
PIF_VALUE: 16

## 2021-01-14 ASSESSMENT — PAIN DESCRIPTION - DESCRIPTORS
DESCRIPTORS: DISCOMFORT
DESCRIPTORS: DISCOMFORT
DESCRIPTORS: BURNING;DISCOMFORT;ACHING

## 2021-01-14 ASSESSMENT — PAIN DESCRIPTION - ORIENTATION: ORIENTATION: LEFT;POSTERIOR;MID

## 2021-01-14 ASSESSMENT — PAIN DESCRIPTION - PROGRESSION
CLINICAL_PROGRESSION: NOT CHANGED
CLINICAL_PROGRESSION: GRADUALLY IMPROVING
CLINICAL_PROGRESSION: NOT CHANGED

## 2021-01-14 ASSESSMENT — PAIN SCALES - GENERAL
PAINLEVEL_OUTOF10: 10
PAINLEVEL_OUTOF10: 8
PAINLEVEL_OUTOF10: 8
PAINLEVEL_OUTOF10: 9

## 2021-01-14 ASSESSMENT — PAIN DESCRIPTION - FREQUENCY
FREQUENCY: CONTINUOUS

## 2021-01-14 ASSESSMENT — PAIN DESCRIPTION - ONSET
ONSET: ON-GOING

## 2021-01-14 ASSESSMENT — PAIN DESCRIPTION - PAIN TYPE
TYPE: SURGICAL PAIN

## 2021-01-14 ASSESSMENT — PAIN DESCRIPTION - LOCATION: LOCATION: NECK

## 2021-01-14 ASSESSMENT — PAIN - FUNCTIONAL ASSESSMENT: PAIN_FUNCTIONAL_ASSESSMENT: PREVENTS OR INTERFERES SOME ACTIVE ACTIVITIES AND ADLS

## 2021-01-14 NOTE — PROGRESS NOTES
Pt arrived to PACU from OR. Pt sleeping on 2l/nc. Posterior neck dressing C/D/I. Hemovac drain intact. Montes to gravity draining clear, yellow urine.

## 2021-01-14 NOTE — BRIEF OP NOTE
Brief Postoperative Note      Patient: Saniya Warren  YOB: 1945  MRN: [id number]    Date of Procedure: 1/14/2021    Pre-Op Diagnosis: CERVICAL DISC DISORDER WITH MYELOPATHY    Post-Op Diagnosis: Same       Procedure(s):  POSTERIOR DECOMPRESSION CERVICAL LAMINECTOMY C4, C5, C6 WITH LATERAL MASS RODS AND SCREWS C4-C7    Surgeon(s):  Yousuf Sinha MD    Assistant:  Surgical Assistant: Lorena Valle; Prudence Smiling    Anesthesia: General    Estimated Blood Loss (mL): less than 420     Complications: None    Specimens:   * No specimens in log *    Implants:  Implant Name Type Inv.  Item Serial No.  Lot No. LRB No. Used Action   GRAFT BNE SUB 5CC DEMIN BNE MTRX PTTY OSTEOINDUCTIVE INJ - [id number]  GRAFT BNE SUB 5CC DEMIN BNE MTRX PTTY OSTEOINDUCTIVE INJ [id number] [facility]  N/A 1 Implanted         Drains:   Closed/Suction Drain Posterior Neck Accordion 10 Cameroonian (Active)       Urethral Catheter Latex 16 fr (Active)   Urine Color Yellow 01/14/21 1330   Urine Appearance Clear 01/14/21 1330       Findings: severe canal stenosis C45 C56    Electronically signed by Yousuf Sinha MD on 1/14/2021 at 1:34 PM     [id number]

## 2021-01-14 NOTE — ANESTHESIA PRE PROCEDURE
Department of Anesthesiology  Preprocedure Note       Name:  Tobias Ureña   Age:  77 y.o.  :  1945                                          MRN:  [id number]         Date:  2021      Surgeon: Richie Gibson):  Anthony Candelaria MD    Procedure: Procedure(s):  POSTERIOR DECOMPRESSION CERVICAL LAMINECTOMY C4, C5, C6 WITH LATERAL MASS RODS AND SCREWS C4-C7    Medications prior to admission:   Prior to Admission medications    Medication Sig Start Date End Date Taking?  Authorizing Provider   dextrose 5 % SOLN 100 mL with belatacept 250 MG SOLR Infuse intravenously once Based on weight  Patient gets every 4 weeks   Yes Historical Provider, MD   SITagliptin (JANUVIA) 25 MG tablet Take 25 mg by mouth daily   Yes Historical Provider, MD   Omega-3 Fatty Acids (FISH OIL) 1000 MG CPDR Take 3,000 mg by mouth 4 times daily   Yes Historical Provider, MD   famotidine (PEPCID) 20 MG tablet Take 20 mg by mouth 2 times daily   Yes Historical Provider, MD   FLUoxetine (PROZAC) 10 MG tablet Take 10 mg by mouth three times daily   Yes Historical Provider, MD   QUEtiapine (SEROQUEL) 25 MG tablet Take 25 mg by mouth daily   Yes Historical Provider, MD   mycophenolate (CELLCEPT) 250 MG capsule Take 250 mg by mouth 4 times daily   Yes Historical Provider, MD   trospium (SANCTURA) 20 MG tablet Take 20 mg by mouth 2 times daily   Yes Historical Provider, MD   atorvastatin (LIPITOR) 10 MG tablet Take 10 mg by mouth daily   Yes Historical Provider, MD   Cyanocobalamin (VITAMIN B-12) 1000 MCG extended release tablet Take 1,000 mcg by mouth once a week   Yes Historical Provider, MD       Current medications:    Current Facility-Administered Medications   Medication Dose Route Frequency Provider Last Rate Last Admin    0.9 % sodium chloride infusion   Intravenous Continuous Cj Valadez MD        sodium chloride flush 0.9 % injection 10 mL  10 mL Intravenous 2 times per day Cj Valadez MD  sodium chloride flush 0.9 % injection 10 mL  10 mL Intravenous PRN Montserrat Francisco MD        ceFAZolin (ANCEF) 2 g in dextrose 5 % 100 mL IVPB  2 g Intravenous Once Lian Khan MD           Allergies: Allergies   Allergen Reactions    Azithromycin Rash    Codeine Nausea And Vomiting       Problem List:  There is no problem list on file for this patient. Past Medical History:        Diagnosis Date    Arthritis     Depression     Hyperlipidemia     Kidney disease     pt had donated kidney to mother/other kidney failed and pt received a kidney    Pre-diabetes        Past Surgical History:        Procedure Laterality Date    KIDNEY DONATION      to patient mother    KIDNEY TRANSPLANT      THYROIDECTOMY, PARTIAL         Social History:    Social History     Tobacco Use    Smoking status: Former Smoker     Types: Cigarettes    Smokeless tobacco: Never Used    Tobacco comment: quit smoking 2013   Substance Use Topics    Alcohol use: Not Currently                                Counseling given: Not Answered  Comment: quit smoking 2013      Vital Signs (Current):   Vitals:    01/12/21 1721 01/14/21 0823 01/14/21 0837   BP:   136/89   Pulse:   83   Resp:   16   Temp:   99.5 °F (37.5 °C)   TempSrc:   Temporal   SpO2:   99%   Weight: 147 lb (66.7 kg) 141 lb 15.6 oz (64.4 kg)    Height: 5' 5\" (1.651 m) 5' 5\" (1.651 m)                                               BP Readings from Last 3 Encounters:   01/14/21 136/89       NPO Status: Time of last liquid consumption: 2300                        Time of last solid consumption: 1900                        Date of last liquid consumption: 01/13/21                        Date of last solid food consumption: 01/13/21    BMI:   Wt Readings from Last 3 Encounters:   01/14/21 141 lb 15.6 oz (64.4 kg)     Body mass index is 23.63 kg/m².     CBC:   Lab Results   Component Value Date    WBC 5.8 01/08/2021    RBC 4.01 01/08/2021    HGB 11.3 01/08/2021 (+) renal disease (Has a transplanted kidney, on cellcept):,      (-) hiatal hernia, GERD, PUD, hepatitis, liver disease and bowel prep       Endo/Other:    (+) : arthritis:., .    (-) diabetes mellitus, hypothyroidism, hyperthyroidism, blood dyscrasia               Abdominal:           Vascular:                                      Anesthesia Plan      general     ASA 3       Induction: intravenous. MIPS: Postoperative opioids intended and Prophylactic antiemetics administered. Anesthetic plan and risks discussed with patient. Plan discussed with CRNA. Jesus Alford MD   1/14/2021        This pre-anesthesia assessment may be used as a history and physical.    DOS STAFF ADDENDUM:    Pt seen and examined, chart reviewed (including anesthesia, drug and allergy history). No interval changes to history and physical examination. Anesthetic plan, risks, benefits, alternatives, and personnel involved discussed with patient. Patient verbalized an understanding and agrees to proceed.       Jesus Alford MD  January 14, 2021  9:19 AM

## 2021-01-14 NOTE — PROGRESS NOTES
Pt admitted to floor from PACU, rates neck pain 9/10 at this time, resp e/e,  No s/s distress, accordian drain intact, no drainage at this time in container, ivf infusing w/o difficulty, pt tolerated IS fairly well 1000 at this time, call light in reach.   Electronically signed by Issa Nugent RN on 1/14/2021 at 4:36 PM

## 2021-01-14 NOTE — ANESTHESIA POSTPROCEDURE EVALUATION
Department of Anesthesiology  Postprocedure Note    Patient: Ruba Pastrana  MRN: [id number]  YOB: 1945  Date of evaluation: 1/14/2021  Time:  3:08 PM     Procedure Summary     Date: 01/14/21 Room / Location: [address] [place] [address] / [facility]    Anesthesia Start: 6879 Anesthesia Stop: 1335    Procedure: POSTERIOR DECOMPRESSION CERVICAL LAMINECTOMY C4, C5, C6 WITH LATERAL MASS RODS AND SCREWS C4-C7 (N/A Back) Diagnosis: (CERVICAL DISC DISORDER WITH MYELOPATHY)    Surgeons: Remy Ortiz MD Responsible Provider: Miriam Puga MD    Anesthesia Type: general ASA Status: 3          Anesthesia Type: general    Joycelyn Phase I: Joycelyn Score: 8    Joycelyn Phase II:      Last vitals: Reviewed and per EMR flowsheets.        Anesthesia Post Evaluation    Patient location during evaluation: PACU  Patient participation: complete - patient participated  Level of consciousness: awake and alert  Airway patency: patent  Nausea & Vomiting: no nausea and no vomiting  Complications: no  Cardiovascular status: blood pressure returned to baseline  Respiratory status: acceptable  Hydration status: euvolemic

## 2021-01-14 NOTE — H&P
Update History & Physical    The patient's History and Physical of January 14, 2021 was reviewed with the patient and I examined the patient. There was no change. The surgical site was confirmed by the patient and me. Plan: The risks, benefits, expected outcome, and alternative to the recommended procedure have been discussed with the patient. Patient understands and wants to proceed with the procedure.      Electronically signed by Ravi Donahue MD on 1/14/2021 at 10:24 AM

## 2021-01-15 LAB
EKG ATRIAL RATE: 100 BPM
EKG DIAGNOSIS: NORMAL
EKG P AXIS: 65 DEGREES
EKG P-R INTERVAL: 162 MS
EKG Q-T INTERVAL: 346 MS
EKG QRS DURATION: 80 MS
EKG QTC CALCULATION (BAZETT): 446 MS
EKG R AXIS: 76 DEGREES
EKG T AXIS: 27 DEGREES
EKG VENTRICULAR RATE: 100 BPM

## 2021-01-15 PROCEDURE — 97116 GAIT TRAINING THERAPY: CPT

## 2021-01-15 PROCEDURE — 2580000003 HC RX 258: Performed by: NEUROLOGICAL SURGERY

## 2021-01-15 PROCEDURE — 97162 PT EVAL MOD COMPLEX 30 MIN: CPT

## 2021-01-15 PROCEDURE — 6370000000 HC RX 637 (ALT 250 FOR IP): Performed by: NEUROLOGICAL SURGERY

## 2021-01-15 PROCEDURE — 94760 N-INVAS EAR/PLS OXIMETRY 1: CPT

## 2021-01-15 PROCEDURE — 6360000002 HC RX W HCPCS: Performed by: NEUROLOGICAL SURGERY

## 2021-01-15 PROCEDURE — 97530 THERAPEUTIC ACTIVITIES: CPT

## 2021-01-15 PROCEDURE — 97166 OT EVAL MOD COMPLEX 45 MIN: CPT

## 2021-01-15 PROCEDURE — 1200000000 HC SEMI PRIVATE

## 2021-01-15 PROCEDURE — 97535 SELF CARE MNGMENT TRAINING: CPT

## 2021-01-15 RX ADMIN — ATORVASTATIN CALCIUM 10 MG: 10 TABLET, FILM COATED ORAL at 20:55

## 2021-01-15 RX ADMIN — SODIUM CHLORIDE: 9 INJECTION, SOLUTION INTRAVENOUS at 06:35

## 2021-01-15 RX ADMIN — TROSPIUM CHLORIDE 20 MG: 20 TABLET, FILM COATED ORAL at 15:31

## 2021-01-15 RX ADMIN — CEFAZOLIN SODIUM 1 G: 1 INJECTION, POWDER, FOR SOLUTION INTRAMUSCULAR; INTRAVENOUS at 10:36

## 2021-01-15 RX ADMIN — METHOCARBAMOL TABLETS 750 MG: 750 TABLET, COATED ORAL at 07:50

## 2021-01-15 RX ADMIN — FLUOXETINE HYDROCHLORIDE 10 MG: 10 TABLET ORAL at 20:55

## 2021-01-15 RX ADMIN — TROSPIUM CHLORIDE 20 MG: 20 TABLET, FILM COATED ORAL at 06:35

## 2021-01-15 RX ADMIN — METHOCARBAMOL TABLETS 750 MG: 750 TABLET, COATED ORAL at 17:25

## 2021-01-15 RX ADMIN — FLUOXETINE HYDROCHLORIDE 10 MG: 10 TABLET ORAL at 07:50

## 2021-01-15 RX ADMIN — OXYCODONE 5 MG: 5 TABLET ORAL at 17:27

## 2021-01-15 RX ADMIN — MYCOPHENOLATE MOFETIL 250 MG: 250 CAPSULE ORAL at 15:31

## 2021-01-15 RX ADMIN — METHOCARBAMOL TABLETS 750 MG: 750 TABLET, COATED ORAL at 15:31

## 2021-01-15 RX ADMIN — MYCOPHENOLATE MOFETIL 250 MG: 250 CAPSULE ORAL at 17:25

## 2021-01-15 RX ADMIN — OXYCODONE 5 MG: 5 TABLET ORAL at 23:46

## 2021-01-15 RX ADMIN — OXYCODONE HYDROCHLORIDE 10 MG: 10 TABLET ORAL at 07:50

## 2021-01-15 RX ADMIN — ACETAMINOPHEN 650 MG: 325 TABLET ORAL at 15:31

## 2021-01-15 RX ADMIN — MYCOPHENOLATE MOFETIL 250 MG: 250 CAPSULE ORAL at 07:50

## 2021-01-15 RX ADMIN — ONDANSETRON 4 MG: 2 INJECTION INTRAMUSCULAR; INTRAVENOUS at 15:31

## 2021-01-15 RX ADMIN — FAMOTIDINE 20 MG: 20 TABLET, FILM COATED ORAL at 20:55

## 2021-01-15 RX ADMIN — FLUOXETINE HYDROCHLORIDE 10 MG: 10 TABLET ORAL at 15:31

## 2021-01-15 RX ADMIN — HYDROMORPHONE HYDROCHLORIDE 0.5 MG: 1 INJECTION, SOLUTION INTRAMUSCULAR; INTRAVENOUS; SUBCUTANEOUS at 04:22

## 2021-01-15 RX ADMIN — METHOCARBAMOL TABLETS 750 MG: 750 TABLET, COATED ORAL at 20:55

## 2021-01-15 RX ADMIN — CEFAZOLIN SODIUM 1 G: 1 INJECTION, POWDER, FOR SOLUTION INTRAMUSCULAR; INTRAVENOUS at 20:55

## 2021-01-15 RX ADMIN — MYCOPHENOLATE MOFETIL 250 MG: 250 CAPSULE ORAL at 20:55

## 2021-01-15 RX ADMIN — QUETIAPINE FUMARATE 25 MG: 25 TABLET ORAL at 20:55

## 2021-01-15 ASSESSMENT — PAIN DESCRIPTION - DIRECTION
RADIATING_TOWARDS: SHOULDERS

## 2021-01-15 ASSESSMENT — PAIN - FUNCTIONAL ASSESSMENT
PAIN_FUNCTIONAL_ASSESSMENT: PREVENTS OR INTERFERES SOME ACTIVE ACTIVITIES AND ADLS

## 2021-01-15 ASSESSMENT — PAIN DESCRIPTION - PROGRESSION
CLINICAL_PROGRESSION: NOT CHANGED
CLINICAL_PROGRESSION: GRADUALLY WORSENING

## 2021-01-15 ASSESSMENT — PAIN SCALES - GENERAL
PAINLEVEL_OUTOF10: 3
PAINLEVEL_OUTOF10: 6
PAINLEVEL_OUTOF10: 7
PAINLEVEL_OUTOF10: 2
PAINLEVEL_OUTOF10: 0
PAINLEVEL_OUTOF10: 3

## 2021-01-15 ASSESSMENT — PAIN DESCRIPTION - DESCRIPTORS
DESCRIPTORS: ACHING;DISCOMFORT
DESCRIPTORS: DISCOMFORT;ACHING

## 2021-01-15 ASSESSMENT — PAIN DESCRIPTION - LOCATION
LOCATION: NECK

## 2021-01-15 ASSESSMENT — PAIN DESCRIPTION - PAIN TYPE
TYPE: SURGICAL PAIN

## 2021-01-15 ASSESSMENT — PAIN DESCRIPTION - ORIENTATION
ORIENTATION: POSTERIOR

## 2021-01-15 ASSESSMENT — PAIN DESCRIPTION - FREQUENCY: FREQUENCY: CONTINUOUS

## 2021-01-15 ASSESSMENT — PAIN DESCRIPTION - ONSET: ONSET: ON-GOING

## 2021-01-15 NOTE — PROGRESS NOTES
Physical Therapy    Facility/Department: [address] ORTHOPEDICS  Initial Assessment  This note serves as patient discharge summary if pt discharges prior to next PT visit      NAME: Edilia Arreola  : 1945  MRN: [id number]    Date of Service: 1/15/2021    Discharge Recommendations:  Continue to assess pending progress   Edilai Arreola scored a 10/24 on the AM-PAC short mobility form. Current research shows that an AM-PAC score of 17 or less is typically not associated with a discharge to the patient's home setting. Based on the patient's AM-PAC score and their current functional mobility deficits, it is recommended that the patient have 5-7 sessions per week of Physical Therapy at d/c to increase the patient's independence. At this time, this patient demonstrates the endurance, and/or tolerance for 3 hours of therapy each day, with a treatment frequency of 5-7x/wk. Please see assessment section for further patient specific details. PT Equipment Recommendations  Other: cont to assess    Assessment   Body structures, Functions, Activity limitations: Decreased functional mobility ; Decreased safe awareness;Decreased cognition;Decreased strength;Decreased endurance; Increased pain;Decreased balance;Decreased sensation;Decreased ROM  Assessment: 77year old female with pre op diagnosis of cervical disc disorder with myelopathy, to hospital 2021 for POSTERIOR DECOMPRESSION CERVICAL LAMINECTOMY C4, C5, C6 WITH LATERAL MASS RODS AND SCREWS C4-C7 performed by Spring Apple MD.  Patient unable to provide prior status info 1-, but it appears as if ahe was experiencing multiple falls, and required assist for ADLs, due to dysfunction in B LEs, patient states primarily L LE. Status 1-: Mod A for bed mobility, Min A / cues of 2 for transfers, and Min A for 5' wh walker ambulation. Patient demonstrates poor tolerance and alertness throughout therapy session. At current presentation she will require ongoing skilled therapy to maximize functional capability and safety. Will continue to see and assess while in the hospital.  Treatment Diagnosis: Impaired functional mobility  Prognosis: Good  Decision Making: Medium Complexity  History: as noted  Clinical Presentation: Evolving  PT Education: Orientation;Goals;PT Role;Plan of Care;Transfer Training;Gait Training  Barriers to Learning: Fatigue, impaired cog  Activity Tolerance  Activity Tolerance: Patient limited by pain; Patient limited by fatigue;Patient limited by endurance       Patient Diagnosis(es): There were no encounter diagnoses. has a past medical history of Arthritis, Depression, Hyperlipidemia, Kidney disease, and Pre-diabetes. has a past surgical history that includes Thyroidectomy, partial; Kidney transplant; Kidney donation; and cervical laminectomy (N/A, 1/14/2021). Restrictions  Restrictions/Precautions  Restrictions/Precautions: Fall Risk  Required Braces or Orthoses?: Yes  Required Braces or Orthoses  Cervical: [place] J  Position Activity Restriction  Other position/activity restrictions: 1-: Hemovac drain at cervical incision. Vision/Hearing  Vision: (Better vision in R eye. States was going to have cataract sx)  Hearing: Within functional limits  Hearing Exceptions: (right eye is better eye. Was due to have cataract surgery)       Subjective  General  Chart Reviewed:  Yes Additional Pertinent Hx: 77year old female with pre op diagnosis of cervical disc disorder with myelopathy, to hospital 1/14/2021 for POSTERIOR DECOMPRESSION CERVICAL LAMINECTOMY C4, C5, C6 WITH LATERAL MASS RODS AND SCREWS C4-C7 performed by Spring Apple MD. Other PMHx as noted, including kidney donation to family memeber then patient's sole kidney failing, requiring patient to have kidney transplant. Response To Previous Treatment: Not applicable  Family / Caregiver Present: No  Referring Practitioner: Spring Apple MD  Referral Date : 01/14/21  General Comment  Comments: Patient unable to complete a sentence, unable to stay on task. Starts to speak and then slowly fades into sleep. Orientation  Orientation  Overall Orientation Status: Impaired  Orientation Level: Oriented to person;Disoriented to place; Disoriented to time;Disoriented to situation     Social/Functional History  Social/Functional History  Lives With: Spouse  Type of Home: House  Home Layout: One level(laundry 1st floor)  Home Access: Level entry  Bathroom Shower/Tub: Tub/Shower unit  Bathroom Toilet: Standard  Home Equipment: Rolling walker(Patient states stnd walker, but chart review indicates RW)  Additional Comments: sleeps on a regular bed. 1-: patient unable to provide prior status history; will need to clarify with family. Cognition   Cognition  Overall Cognitive Status: Exceptions  Following Commands: Follows one step commands with increased time; Follows one step commands with repetition  Attention Span: Difficulty attending to directions; Attends with cues to redirect; Unable to maintain attention  Memory: Decreased recall of precautions;Decreased short term memory;Decreased recall of biographical Information;Decreased recall of recent events;Decreased long term memory  Safety Judgement: Decreased awareness of need for assistance;Decreased awareness of need for safety Problem Solving: Assistance required to implement solutions;Assistance required to identify errors made;Assistance required to generate solutions;Assistance required to correct errors made  Insights: Not aware of deficits  Initiation: Requires cues for some  Sequencing: Requires cues for some    Objective  AROM RLE (degrees)  RLE AROM: WFL  RLE General AROM: except ankle DF lacks ~15 degrees  AROM LLE (degrees)  LLE AROM : WFL  Strength RLE  Strength RLE: WFL  Comment: but seems 1/2 MMT grade less strong than L  Strength LLE  Strength LLE: WFL  Comment: 4/5  Sensation  Overall Sensation Status: (States that her feet feel like \"blocks; I really can't describe it. \" Normal sensation at rest.)  Bed mobility  Supine to Sit: Moderate assistance  Sit to Supine: Unable to assess(in BS chair at end of session)  Transfers  Sit to Stand: Minimal Assistance(From EOB. Verbal and tactile cues for technique)  Stand to sit: Minimal Assistance(Cues for positioning to BS chair completely, hand placement, and hip/knee flexion for sit.)  Ambulation  Ambulation?: Yes  Ambulation 1  Surface: level tile  Device: Rolling Walker  Assistance: Minimal assistance;2 Person assistance  Quality of Gait: Shortened steps, patient req's cues to stay on task throughout. Min posterior lean. Appears generally fatiguing to patient. Gait Deviations: Decreased step length;Decreased step height  Distance: 5'  Comments: This is max distance tolerated, with patient appearing mod fatigued, having pain, and poor attention to task. Stairs/Curb  Stairs?: No  Balance  Posture: Fair  Sitting - Static: Poor;+  Sitting - Dynamic: Poor;+  Standing - Static: Poor;+  Standing - Dynamic: Poor;+(@ RW)  Comments: Very limited stance and gait tolerance.      Plan   Plan  Times per week: 5  Current Treatment Recommendations: Transfer Training, Functional Mobility Training, Gait Training, Cognitive Reorientation  Safety Devices Type of devices: Call light within reach, Chair alarm in place, Gait belt, Patient at risk for falls, Left in chair, Nurse notified(MORGAN Quarles informed. Thin Ice pack placed under posterior portion of cervical collar. Step stool placed under feet. [place] rolls alongside patient thighs for UE support.)    Goals  Short term goals  Time Frame for Short term goals: By acute DC. Short term goal 1: Bed mobility Min A  Short term goal 2: Transfers Min A  Short term goal 3: Wh walker amb 28' Min A  Patient Goals   Patient goals : Does not state, other than pain relief.      Therapy Time   Individual Concurrent Group Co-treatment   Time In [facility]         Time Out 1015         Minutes 68            Krystian Freeman PT  Electronically signed by Harshil Roche, PT [id number] [phone])  on 1/15/2021 at 10:42 AM

## 2021-01-15 NOTE — PROGRESS NOTES
Checking on patient Q2H for nutrition needs, hygiene needs, comfort measures, mobility, fall risk interventions, and safe environment. All precautions and interventions in place. Educated patient on use of call light and telephone. Patient verbalizes understanding. Call light/telephone in reach.   Electronically signed by Billy Thomason RN on 1/15/2021 at 7:35 AM

## 2021-01-15 NOTE — PLAN OF CARE
Problem: Falls - Risk of:  Goal: Will remain free from falls  Description: Will remain free from falls  1/14/2021 1936 by Sara Machado RN  Outcome: Ongoing  Note: Fall risk assessment completed. Fall precautions in place. Call light within reach. Pt educated on calling for assistance before getting up. Walkway free of clutter. Will continue to monitor. 1/14/2021 1754 by Mima Willson RN  Outcome: Ongoing  Goal: Absence of physical injury  Description: Absence of physical injury  1/14/2021 1936 by Sara Machado RN  Outcome: Ongoing  1/14/2021 1754 by Mima Willson RN  Outcome: Ongoing     Problem: Skin Integrity:  Goal: Will show no infection signs and symptoms  Description: Will show no infection signs and symptoms  1/14/2021 1936 by Sara Machado RN  Outcome: Ongoing  Note: Will monitor skin and mucous members. Will turn patient every 2 hours, monitor for friction and sheering, and change dressings as needed. Will preform skin assessment every shift. 1/14/2021 1754 by Mima Willson RN  Outcome: Ongoing  Goal: Absence of new skin breakdown  Description: Absence of new skin breakdown  1/14/2021 1936 by Sara Machado RN  Outcome: Ongoing  1/14/2021 1754 by Mima Willson RN  Outcome: Ongoing     Problem: Pain:  Goal: Pain level will decrease  Description: Pain level will decrease  1/14/2021 1936 by Sara Machado RN  Outcome: Ongoing  Note: Pt assessed for pain. Pt in pain and assessed with 0-10 pain rating scale. Pt given prescribed analgesic for pain. (See eMar) Pt satisfied with pain relief thus far. Will reassess and continue to monitor.      1/14/2021 1754 by Mima Willson RN  Outcome: Ongoing  Goal: Control of acute pain  Description: Control of acute pain  1/14/2021 1936 by Sara Machado RN  Outcome: Ongoing  1/14/2021 1754 by Mima Willson RN  Outcome: Ongoing  Goal: Control of chronic pain  Description: Control of chronic pain  1/14/2021 1936 by Sara Machado RN  Outcome: Ongoing 1/14/2021 1754 by Mansoor Malcolm RN  Outcome: Ongoing

## 2021-01-15 NOTE — PROGRESS NOTES
I's and O's have been difficult to collect accurately as patient has had two bouts of incontinence. Patient stating she is having burning with urination. Educated to use incentive spirometer 10x an hour per physician order. Ice pack in place, drain compressed. Patient in bed resting comfortably in no apparent distress.

## 2021-01-15 NOTE — PROGRESS NOTES
Occupational Therapy   Occupational Therapy Initial Assessment  Date: 1/15/2021   Patient Name: Josee Patricio  MRN: [id number]     : 1945    Date of Service: 1/15/2021    Discharge Recommendations:  Continue to assess pending progress, Patient would benefit from continued therapy after discharge  OT Equipment Recommendations  Other: gait belt, elevated BS table, RTS + arms, reacher--gave info to order from [employer] scored a  on the AM-PAC ADL Inpatient form. Current research shows that an AM-PAC score of 17 or less is typically not associated with a discharge to the patient's home setting. Based on the patient's AM-PAC score and their current ADL deficits, it is recommended that the patient have 3-5 sessions per week of Occupational Therapy at d/c to increase the patient's independence. Please see assessment section for further patient specific details. If patient discharges prior to next session this note will serve as a discharge summary. Please see below for the latest assessment towards goals. Assessment   Performance deficits / Impairments: Decreased functional mobility ; Decreased endurance;Decreased ADL status; Decreased balance;Decreased posture;Decreased strength;Decreased safe awareness;Decreased cognition Assessment: pt is a 76 y/o female who underwent elective decompressive cervical laminectomy C4-6 d/t myelopathy. PTA, she was fairly IND w/ most ADLs,  helped w/ shoes prn; she completed simple meal prep and did not use AD for ambulation;  states he \"walks behind her\" when she is unsteady. Currently pt requires max A w/ toileting, LB ADLs, required min A of 2 for sit<>stand A at RW, was unable to take a few steps d/t grogginess. Pt is functioning below previous occupational performance level & would benefit from OT services to address above stated deficits.  present & [facility] (he took off work x 1 wk). Pt will need to be more IND in order to return home  Treatment Diagnosis: impaired ADls  Prognosis: Fair  Decision Making: Medium Complexity  History: see chart  Exam: sit<>stand at [address] [facility] [place] / Modification: min A of 2  OT Education: OT Role;Plan of Care;Precautions; ADL Adaptive Strategies  Patient Education: educated pt &  on purpose of OT, cervical prec & wearing of collar, use ofA/E and DME  Barriers to Learning: pt very groggy, limited recall  REQUIRES OT FOLLOW UP: Yes  Activity Tolerance  Activity Tolerance: Patient limited by fatigue;Treatment limited secondary to decreased cognition  Activity Tolerance: very groggy from pain meds, limited responses & command following  Safety Devices  Safety Devices in place: Yes  Type of devices: Left in chair;Nurse notified;Call light within reach; Chair alarm in place;Gait belt           Patient Diagnosis(es): There were no encounter diagnoses. has a past medical history of Arthritis, Depression, Hyperlipidemia, Kidney disease, and Pre-diabetes. has a past surgical history that includes Thyroidectomy, partial; Kidney transplant; Kidney donation; and cervical laminectomy (N/A, 1/14/2021).     Treatment Diagnosis: impaired ADls      Restrictions  Restrictions/Precautions  Restrictions/Precautions: Fall Risk Required Braces or Orthoses?: Yes  Required Braces or Orthoses  Cervical: mi[place] J  Position Activity Restriction  Other position/activity restrictions: 1-: Hemovac drain at cervical incision. Subjective   General  Additional Pertinent Hx: per Dr Tracy Torres H&P note on 1/14/21: \" The patient is a 71-year-old with symptoms of myelopathy. She failed conservative treatments. MRI showed acquired canal stenosis,worse at C4-5, C5-6 with a significant posterior component contributingto the stenosis.   She elected to undergo posterior decompression andstabilization\"  Referring Practitioner: Dr Tracy Torres  Diagnosis: cervical disc disease w/ myelopathy  Subjective  Subjective: met in room, siting up in recliner, wearing cervical collar  General Comment  Comments: per RN ok to evaluate, pt dosing off during session, does not rate pain  Vital Signs  Temp: 98.4 °F (36.9 °C)  Temp Source: Oral  Pulse: 116  Heart Rate Source: Monitor  Resp: 18  BP: (!) 176/84  BP Location: Left Arm  MAP (mmHg): 114  Oxygen Therapy  SpO2: 93 %  O2 Device: None (Room air)  Social/Functional History  Social/Functional History  Lives With: Spouse  Type of Home: House  Home Layout: One level(laundry 1st floor)  Home Access: Level entry  Bathroom Shower/Tub: Tub/Shower unit, Walk-in shower(pt used tub/shower PTA,  stood by to guide in safely)  Bathroom Toilet: Standard(pt's bathrm is standard, 's is comfort ht)  Bathroom Equipment: Hand-held shower, Shower chair  Bathroom Accessibility: Walker accessible  Home Equipment: Rolling walker(gait belt)  ADL Assistance: Needs assistance( helped when needed to manage her shoes & socks)  Homemaking Responsibilities: Yes  Meal Prep Responsibility: Primary(microwave meal prep)  Laundry Responsibility: Primary  Ambulation Assistance: Independent(no AD however  walked behind her when unsteady)  Transfer Assistance: Independent  Active : No  Occupation: Retired Following Commands: Follows one step commands with increased time; Follows one step commands with repetition  Attention Span: Difficulty attending to directions; Attends with cues to redirect; Unable to maintain attention  Memory: Decreased recall of precautions;Decreased short term memory;Decreased recall of biographical Information;Decreased recall of recent events;Decreased long term memory  Safety Judgement: Decreased awareness of need for assistance;Decreased awareness of need for safety  Problem Solving: Assistance required to implement solutions;Assistance required to identify errors made;Assistance required to generate solutions;Assistance required to correct errors made  Insights: Not aware of deficits  Initiation: Requires cues for some  Sequencing: Requires cues for some  Cognition Comment: pt very groggy, difficulty responding & following commands        Sensation  Overall Sensation Status: (States that her feet feel like \"blocks; I really can't describe it. \" Normal sensation at rest.)        LUE AROM (degrees)  LUE AROM : Exceptions  L Shoulder Flexion 0-180: 0-90 w/ pain  Left Hand AROM (degrees)  Left Hand AROM: WNL  RUE AROM (degrees)  RUE AROM : Exceptions  R Shoulder Flexion 0-180: 0-90 w/ pain  Right Hand AROM (degrees)  Right Hand AROM: WFL  LUE Strength  Gross LUE Strength: Exceptions to [facility]  L Elbow Flex: 4/5  L Hand General: 4/5  LUE Strength Comment: deniz RICO  RUE Strength  Gross RUE Strength: Exceptions to [facility]  R Elbow Flex: 4-/5  R Hand General: 4-/5  RUE Strength Comment: deniz RICO                   Plan   Plan  Times per week: 3-5  Specific instructions for Next Treatment: may need co tx, sponge bathing LB ADLs, instruct w/ A/E Current Treatment Recommendations: Strengthening, Gait Training, Patient/Caregiver Education & Training, Equipment Evaluation, Education, & procurement, Balance Training, Functional Mobility Training, Cognitive Reorientation, Self-Care / ADL, Safety Education & Training  Plan Comment:  took off x 1 wk , will need  services         AM-PAC Score        AM-PAC Inpatient Daily Activity Raw Score: 12 (01/15/21 1242)  AM-PAC Inpatient ADL T-Scale Score : 30.6 (01/15/21 1242)  ADL Inpatient CMS 0-100% Score: 66.57 (01/15/21 1242)  ADL Inpatient CMS G-Code Modifier : CL (01/15/21 1242)    Goals  Short term goals  Time Frame for Short term goals: by 3-5 sessions pt will complete  Short term goal 1: Feeding & grooming w/ set up  Short term goal 2: UB dressing w/ min A--suggested using loose fitting button front shirts  Short term goal 3: LB dressing & sponge bathing w/ min A using A/E  Short term goal 4: toileting w/ CGA  Short term goal 5: household t/f using RW w/ CGA  Long term goals  Long term goal 1: cont to address fam ed & DME needs-- Zaira Jimenez made a list to purchase gait belt, elevated BS table, RTS + arms, reacher  Patient Goals   Patient goals : \"walk\"       Therapy Time   Individual Concurrent Group Co-treatment   Time In 2813         Time Out 1225         Minutes 40         Timed Code Treatment Minutes: [address], OTR/L #[id number]

## 2021-01-15 NOTE — PROGRESS NOTES
[address] Day: 2  POD #1  Operation:   1. Posterior cervical laminectomy, C4, C5, C6, bilateral complete. 2.  Posterior instrumentation and fusion, C4-5, C5-6, C6-7 bilateral  using RTI lateral mass screws and rods. 3.  Posterolateral fusion, C4-5, C5-6, C6-7 using demineralized bone  matrix and morcellated autograft. 4. Placement of drain  BP (!) 144/72   Pulse 93   Temp 98.3 °F (36.8 °C) (Axillary)   Resp 16   Ht 5' 5\" (1.651 m)   Wt 145 lb 1 oz (65.8 kg)   SpO2 95%   BMI 24.14 kg/m²     Patient admitted for the above procedure for c/o difficulty walking, \"particularly with the left (?) leg,\" and clumsiness, falling. Slept poorly last night due to surgical posterior neck pain. She is sleepy at present and has difficulty focusing and staying awake. Received Oxycodone 10 mg at 7 am. Taking jello fine w/o nausea. Montes removed and urinating. Patient is not clear about her pre op status pre-operative pain, numbness, weakness. Complaining of posterior neck pain. Pain moderately well controlled on current medications. Appears uncomfortable with head movement and upper extremity motion/arm raise. 5/5 motor strength in bilateral HG, bends the arms symmetrically. Resistance testing is too painful for her now. Able to hold her cut to drink with the right hand. Hip flexor stronger on the left than the right. Ankle flexion also stronger on the left. Got up with assistance of two with a lot of verbal cues. Stood up with little assistance of two, with rolling walker. Dressing dry and intact with small amount of serosanguinous drainage. Drain has thin light serosanguinous drainage. 40 ml out overnight  Ambulating slowly with short steps with steady gait to the chair. Labs reviewed. Med list and MAR reviewed. Plan:  Continue drain. Continue Ancef until drain is removed. Continue PT/OT/current care. Reduce narcotics/sedatin. Incentive spirometer.

## 2021-01-15 NOTE — PROGRESS NOTES
Patient A&O in the bed. Patient tolerating PO intake well. PM medications given without complications. Patient denies nausea or vomiting. Patient complains of pain, PRN pain medication given. Patient complains of chest pain - describes it at feeling more like acid reflux, complains of pain L arm but states it is because of how she is sitting - patient denies SOB, jaw pain, N&V, no visible sweating. STAT EKG ordered per protocol. An Allan MD notified via telephone. EKG results normal. Patient's  notified of patient's status per patient request. Dressing to posterior neck is clean, dry, intact with accordion drain in place. Neuro checks WDL limits. Call light within reach, able to make needs known, fall precautions in place. Will check on patient Q2 hours.     Electronically signed by Brian Gutierrez RN on 1/15/2021 at 2:10 AM

## 2021-01-15 NOTE — PROGRESS NOTES
Indwelling villar catheter removed per MD order. 600mL of clear, yellow urine drained from collection bag prior to removal. Catheter intact, patient tolerated well. Patient instructed to to call out before getting out of bed to use restroom - patient verbalized understanding. 40mL of bloody drainage emptied from accordion drain.     Electronically signed by Konrad Hope RN on 1/15/2021 at 6:50 AM

## 2021-01-15 NOTE — PLAN OF CARE
Problem: Falls - Risk of:  Goal: Will remain free from falls  Description: Will remain free from falls  1/15/2021 0734 by Noreen Sutton RN  Outcome: Ongoing  Note: Fall risk assessment completed . Fall precautions in place, bed alarm on, side rails 2/4 up, call light in reach, educated pt on calling for assistance when needed, room clear of clutter. Pt verbalized understanding. 1/14/2021 1936 by Luis Antonio Thomas RN  Outcome: Ongoing  Note: Fall risk assessment completed. Fall precautions in place. Call light within reach. Pt educated on calling for assistance before getting up. Walkway free of clutter. Will continue to monitor. 1/14/2021 1754 by Flora Greene RN  Outcome: Ongoing  Goal: Absence of physical injury  Description: Absence of physical injury  1/15/2021 0734 by Noreen Sutton RN  Outcome: Ongoing  1/14/2021 1936 by Luis Antonio Thomas RN  Outcome: Ongoing  1/14/2021 1754 by Flora Greene RN  Outcome: Ongoing     Problem: Skin Integrity:  Goal: Will show no infection signs and symptoms  Description: Will show no infection signs and symptoms  1/15/2021 0734 by Noreen Sutton RN  Outcome: Ongoing  Note: Patient is alert and oriented, afebrile, has manageable complaints of pain, skin is intact and appropriate for ethnicity in color    1/14/2021 1936 by Luis Antonio Thomas RN  Outcome: Ongoing  Note: Will monitor skin and mucous members. Will turn patient every 2 hours, monitor for friction and sheering, and change dressings as needed. Will preform skin assessment every shift. 1/14/2021 1754 by Flora Greene RN  Outcome: Ongoing  Goal: Absence of new skin breakdown  Description: Absence of new skin breakdown  1/15/2021 0734 by Noreen Sutton RN  Outcome: Ongoing  Note: Ruddy score assessed. Patient able to ambulate and turn self and repositioned patient Q2H and assessed skin. Educated patient on importance of repositioning to prevent skin issues.      1/14/2021 1936 by Luis Antonio Thomas RN

## 2021-01-15 NOTE — OP NOTE
[address] [address]                                OPERATIVE REPORT    PATIENT NAME: Kaykay Lagos                       :        1945  MED REC NO:   [id number]                          ROOM:  ACCOUNT NO:   [de-identified]                           ADMIT DATE: 2021  PROVIDER:     Poppy Osborne MD    DATE OF PROCEDURE:  2021    PREOPERATIVE DIAGNOSES:  Cervical disk disorder with myelopathy. POSTOPERATIVE DIAGNOSES:  Cervical disk disorder with myelopathy. OPERATION PERFORMED:  1. Posterior cervical laminectomy, C4, C5, C6, bilateral complete. 2.  Posterior instrumentation and fusion, C4-5, C5-6, C6-7 bilateral  using RTI lateral mass screws and rods. 3.  Posterolateral fusion, C4-5, C5-6, C6-7 using demineralized bone  matrix and morcellated autograft. 4.  Physician-directed and interpreted intraoperative fluoroscopy. SURGEON:  Poppy Osborne MD    ANESTHESIA:  General endotracheal.    COMPLICATIONS:  None apparent. EBL: 75cc    INDICATIONS:  The patient is a 61-year-old with symptoms of myelopathy. She failed conservative treatments. MRI showed acquired canal stenosis,  worse at C4-5, C5-6 with a significant posterior component contributing  to the stenosis. She elected to undergo posterior decompression and  stabilization. OPERATIVE PROCEDURE:  After obtaining informed consent, she was taken to  the operating suite on 2021. General endotracheal intubation was  initiated. She was positioned prone onto laminectomy rolls with head  secured to Kenney fixation with her chin flexed and chin two  fingerbreadths above the sternum. A midline cervical incision was  planned and fluoroscopy was brought in to localize C2 to C7. She was  prepped and draped in typical surgical fashion. Skin was infiltrated  with Marcaine and epinephrine. Scalpel was used to incise the skin. Bovie was used to dissect midline  tissues from C7 to C3. Level was confirmed intraoperatively with  fluoroscopy. Bilateral subperiosteal dissections were performed with  Bovie and Crystal to expose the spinous processes to lateral masses of C4  to C7 bilaterally, and self-retaining retractors were placed. A drill  was then used to perform decorticated entry points for the lateral mass  screws and this was set at 1 mm medial and 1 mm inferior to the midpoint  of the lateral mass. Confirmation of entry point in trajectory was  confirmed on fluoroscopy. A handheld drill  with a stop guide set at 10 mm was then used to  drill  holes angled 20 degrees cephalad and 20 degrees lateral.  A  3.0 hand-driven tap was then used to tap the  holes bilaterally of  C3 to C7.  12-mm, 3.5 diameter lateral mass screws were then advanced  through the lateral masses bilaterally, C3 to C7, and fluoroscopy was  used to perform confirmatory pictures. Following this, a drill was then used to drill a trough along the  interface between lamina and lateral mass of C4, C5 and C6 bilaterally. A 2-mm Kerrison was then used to remove the remainder of eggshell thin  bone. Nerve hook was used to elevate ligament and 2-mm Kerrison was  used to dissect and disconnect the posterior elements. The posterior  elements were grasped with Kochers. Samantha Shaker was used to make sure  that there was no adhesion to the dorsum of the thecal sac, and  posterior elements were then lifted off and delivered off the field. This completed decompression of the canal from C4 to C6. Electrocautery and FloSeal were used for epidural bleeding. Hemostasis  was achieved. Rods were selected and then were bent to lordosis. These  were secured over the lateral mass screws bilaterally of C4 to C7.   Set  screws were placed and then this was torqued with a torquing device, completing posterior instrumentation and fusion from C4 to C7  bilaterally. Lateral edges of bone were decorticated and demineralized  bone matrix with some morcellated autograft was then packed along the  lateral edges, completing a posterolateral fusion bilaterally of C4 to  C7. A drain was placed. Retractors were removed. Final fluoroscopic images  were obtained. The incision was then closed in multiple layers. Skin  was closed. Dressing was applied. Hard collar was applied. The  patient was extubated and transferred to recovery in stable condition,  moving all four extremities. There were no apparent complications. All  counts were correct. A time-out procedure was performed prior to  incision. I was present for the entire procedure.         Nupur Walter MD    D: 01/14/2021 13:49:06       T: 01/14/2021 13:57:16     [email or web address]  Job#: [id number]     Doc#: [id number]    CC:

## 2021-01-16 PROCEDURE — 6370000000 HC RX 637 (ALT 250 FOR IP): Performed by: NEUROLOGICAL SURGERY

## 2021-01-16 PROCEDURE — 97530 THERAPEUTIC ACTIVITIES: CPT | Performed by: PHYSICAL THERAPIST

## 2021-01-16 PROCEDURE — 97530 THERAPEUTIC ACTIVITIES: CPT

## 2021-01-16 PROCEDURE — 2580000003 HC RX 258: Performed by: NEUROLOGICAL SURGERY

## 2021-01-16 PROCEDURE — 97535 SELF CARE MNGMENT TRAINING: CPT

## 2021-01-16 PROCEDURE — 6360000002 HC RX W HCPCS: Performed by: NEUROLOGICAL SURGERY

## 2021-01-16 PROCEDURE — 97116 GAIT TRAINING THERAPY: CPT | Performed by: PHYSICAL THERAPIST

## 2021-01-16 PROCEDURE — 1200000000 HC SEMI PRIVATE

## 2021-01-16 RX ADMIN — CEFAZOLIN SODIUM 1 G: 1 INJECTION, POWDER, FOR SOLUTION INTRAMUSCULAR; INTRAVENOUS at 08:17

## 2021-01-16 RX ADMIN — TROSPIUM CHLORIDE 20 MG: 20 TABLET, FILM COATED ORAL at 05:07

## 2021-01-16 RX ADMIN — TROSPIUM CHLORIDE 20 MG: 20 TABLET, FILM COATED ORAL at 17:13

## 2021-01-16 RX ADMIN — METHOCARBAMOL TABLETS 750 MG: 750 TABLET, COATED ORAL at 12:10

## 2021-01-16 RX ADMIN — ATORVASTATIN CALCIUM 10 MG: 10 TABLET, FILM COATED ORAL at 21:09

## 2021-01-16 RX ADMIN — OXYCODONE 5 MG: 5 TABLET ORAL at 19:46

## 2021-01-16 RX ADMIN — FAMOTIDINE 20 MG: 20 TABLET, FILM COATED ORAL at 21:09

## 2021-01-16 RX ADMIN — OXYCODONE 5 MG: 5 TABLET ORAL at 05:08

## 2021-01-16 RX ADMIN — METHOCARBAMOL TABLETS 750 MG: 750 TABLET, COATED ORAL at 17:13

## 2021-01-16 RX ADMIN — ACETAMINOPHEN 650 MG: 325 TABLET ORAL at 13:06

## 2021-01-16 RX ADMIN — FLUOXETINE HYDROCHLORIDE 10 MG: 10 TABLET ORAL at 08:19

## 2021-01-16 RX ADMIN — METHOCARBAMOL TABLETS 750 MG: 750 TABLET, COATED ORAL at 21:09

## 2021-01-16 RX ADMIN — Medication 10 ML: at 21:10

## 2021-01-16 RX ADMIN — QUETIAPINE FUMARATE 25 MG: 25 TABLET ORAL at 21:09

## 2021-01-16 RX ADMIN — METHOCARBAMOL TABLETS 750 MG: 750 TABLET, COATED ORAL at 08:17

## 2021-01-16 RX ADMIN — ACETAMINOPHEN 650 MG: 325 TABLET ORAL at 08:18

## 2021-01-16 RX ADMIN — MYCOPHENOLATE MOFETIL 250 MG: 250 CAPSULE ORAL at 12:09

## 2021-01-16 RX ADMIN — FLUOXETINE HYDROCHLORIDE 10 MG: 10 TABLET ORAL at 12:09

## 2021-01-16 RX ADMIN — FLUOXETINE HYDROCHLORIDE 10 MG: 10 TABLET ORAL at 19:46

## 2021-01-16 RX ADMIN — CEFAZOLIN SODIUM 1 G: 1 INJECTION, POWDER, FOR SOLUTION INTRAMUSCULAR; INTRAVENOUS at 21:09

## 2021-01-16 RX ADMIN — MYCOPHENOLATE MOFETIL 250 MG: 250 CAPSULE ORAL at 08:19

## 2021-01-16 RX ADMIN — MYCOPHENOLATE MOFETIL 250 MG: 250 CAPSULE ORAL at 21:09

## 2021-01-16 RX ADMIN — ACETAMINOPHEN 650 MG: 325 TABLET ORAL at 17:13

## 2021-01-16 RX ADMIN — MYCOPHENOLATE MOFETIL 250 MG: 250 CAPSULE ORAL at 17:13

## 2021-01-16 ASSESSMENT — PAIN SCALES - GENERAL
PAINLEVEL_OUTOF10: 0
PAINLEVEL_OUTOF10: 2
PAINLEVEL_OUTOF10: 0
PAINLEVEL_OUTOF10: 3
PAINLEVEL_OUTOF10: 0

## 2021-01-16 ASSESSMENT — PAIN DESCRIPTION - DIRECTION
RADIATING_TOWARDS: EARS
RADIATING_TOWARDS: SHOULDERS

## 2021-01-16 ASSESSMENT — PAIN DESCRIPTION - DESCRIPTORS
DESCRIPTORS: ACHING

## 2021-01-16 ASSESSMENT — PAIN DESCRIPTION - FREQUENCY
FREQUENCY: CONTINUOUS

## 2021-01-16 ASSESSMENT — PAIN DESCRIPTION - PAIN TYPE
TYPE: SURGICAL PAIN
TYPE: SURGICAL PAIN
TYPE: ACUTE PAIN;SURGICAL PAIN
TYPE: SURGICAL PAIN

## 2021-01-16 ASSESSMENT — PAIN DESCRIPTION - ORIENTATION
ORIENTATION: POSTERIOR

## 2021-01-16 ASSESSMENT — PAIN DESCRIPTION - PROGRESSION
CLINICAL_PROGRESSION: NOT CHANGED
CLINICAL_PROGRESSION: NOT CHANGED
CLINICAL_PROGRESSION: GRADUALLY WORSENING
CLINICAL_PROGRESSION: NOT CHANGED
CLINICAL_PROGRESSION: NOT CHANGED

## 2021-01-16 ASSESSMENT — PAIN DESCRIPTION - ONSET
ONSET: ON-GOING

## 2021-01-16 ASSESSMENT — PAIN DESCRIPTION - LOCATION: LOCATION: NECK

## 2021-01-16 NOTE — PLAN OF CARE
Problem: Falls - Risk of:  Goal: Will remain free from falls  Description: Will remain free from falls  Outcome: Ongoing  Note: Fall risk assessment completed. Fall precautions in place. Call light within reach. Pt educated on calling for assistance before getting up. Walkway free of clutter. Will continue to monitor. Goal: Absence of physical injury  Description: Absence of physical injury  Outcome: Ongoing  Note: Pt is free of injury. No injury noted. Fall precautions in place. Call light within reach. Will monitor. Problem: Skin Integrity:  Goal: Will show no infection signs and symptoms  Description: Will show no infection signs and symptoms  Outcome: Ongoing  Note: Pt is free of signs and symptoms of infection. Incision and dressing are clean, dry and intact. Vital signs stable. Will monitor. Goal: Absence of new skin breakdown  Description: Absence of new skin breakdown  Outcome: Ongoing     Problem: Pain:  Goal: Pain level will decrease  Description: Pain level will decrease  Outcome: Ongoing  Note: Pt assessed for pain. Pt in pain and assessed with 0-10 pain rating scale. Pt given prescribed analgesic for pain. (See eMar) Pt satisfied with pain relief thus far. Will reassess and continue to monitor. Goal: Control of acute pain  Description: Control of acute pain  Outcome: Ongoing  Note: Pt assessed for pain. Pt in pain and assessed with 0-10 pain rating scale. Pt given prescribed analgesic for pain. (See eMar) Pt satisfied with pain relief thus far. Will reassess and continue to monitor. Goal: Control of chronic pain  Description: Control of chronic pain  Outcome: Ongoing  Note: Pt assessed for pain. Pt in pain and assessed with 0-10 pain rating scale. Pt given prescribed analgesic for pain. (See eMar) Pt satisfied with pain relief thus far. Will reassess and continue to monitor.

## 2021-01-16 NOTE — PROGRESS NOTES
Checking on patient Q2H for nutrition needs, hygiene needs, comfort measures, mobility, fall risk interventions, and safe environment. All precautions and interventions in place. Educated patient on use of call light and telephone. Patient verbalizes understanding. Call light/telephone in reach.   Electronically signed by Claudean Fitch, RN on 1/16/2021 at 7:19 AM

## 2021-01-16 NOTE — PLAN OF CARE
Problem: Falls - Risk of:  Goal: Will remain free from falls  Description: Will remain free from falls  1/16/2021 0718 by Yvette Zahng RN  Outcome: Ongoing  Note: Fall risk assessment completed . Fall precautions in place, bed alarm on, side rails 2/4 up, call light in reach, educated pt on calling for assistance when needed, room clear of clutter. Pt verbalized understanding. 1/15/2021 2323 by Chanetta Carrel, RN  Outcome: Ongoing  Note: Fall risk assessment completed. Fall precautions in place. Call light within reach. Pt educated on calling for assistance before getting up. Walkway free of clutter. Will continue to monitor. Goal: Absence of physical injury  Description: Absence of physical injury  1/16/2021 0718 by Yvette Zhang RN  Outcome: Ongoing  1/15/2021 2323 by Chanetta Carrel, RN  Outcome: Ongoing  Note: Pt is free of injury. No injury noted. Fall precautions in place. Call light within reach. Will monitor. Problem: Skin Integrity:  Goal: Will show no infection signs and symptoms  Description: Will show no infection signs and symptoms  1/16/2021 0718 by Yvette Zhang RN  Outcome: Ongoing  Note: Patient is alert and oriented, afebrile, has manageable complaints of pain, skin is intact and appropriate for ethnicity in color    1/15/2021 2323 by Chanetta Carrel, RN  Outcome: Ongoing  Note: Pt is free of signs and symptoms of infection. Incision and dressing are clean, dry and intact. Vital signs stable. Will monitor. Goal: Absence of new skin breakdown  Description: Absence of new skin breakdown  1/16/2021 0718 by Yvette Zhang RN  Outcome: Ongoing  Note: Ruddy score assessed. Patient unable to ambulate and turn self. Repositioned patient Q2H and assessed skin. Educated patient on importance of repositioning to prevent skin issues.      1/15/2021 2323 by Chanetta Carrel, RN  Outcome: Ongoing     Problem: Pain:  Goal: Pain level will decrease Description: Pain level will decrease  1/16/2021 0718 by Heather Davison RN  Outcome: Ongoing  Note: Pain /discomfort being managed with PRN analgesics per MD orders. Patient able to express presence and absence of pain and rate pain appropriately using numerical scale. 1/15/2021 2323 by Sunitha Walters RN  Outcome: Ongoing  Note: Pt assessed for pain. Pt in pain and assessed with 0-10 pain rating scale. Pt given prescribed analgesic for pain. (See eMar) Pt satisfied with pain relief thus far. Will reassess and continue to monitor. Goal: Control of acute pain  Description: Control of acute pain  1/16/2021 0718 by Heather Davison RN  Outcome: Ongoing  1/15/2021 2323 by Sunitha Walters RN  Outcome: Ongoing  Note: Pt assessed for pain. Pt in pain and assessed with 0-10 pain rating scale. Pt given prescribed analgesic for pain. (See eMar) Pt satisfied with pain relief thus far. Will reassess and continue to monitor. Goal: Control of chronic pain  Description: Control of chronic pain  1/16/2021 0718 by Heather Davison RN  Outcome: Ongoing  1/15/2021 2323 by Sunitha Walters RN  Outcome: Ongoing  Note: Pt assessed for pain. Pt in pain and assessed with 0-10 pain rating scale. Pt given prescribed analgesic for pain. (See eMar) Pt satisfied with pain relief thus far. Will reassess and continue to monitor.

## 2021-01-16 NOTE — PROGRESS NOTES
Occupational Therapy  Facility/Department: [address] ORTHOPEDICS  Daily Treatment Note  NAME: Zhane Escoto  : 1945  MRN: [id number]    Date of Service: 2021    Discharge Recommendations:  24 hour supervision or assist, Home with Home health OT, S Level 3, Patient would benefit from continued therapy after discharge     Zhane Escoto scored a 16/24 on the [address] form. Current research shows that an AM-PAC score of 18 or greater is typically associated with a discharge to the patient's home setting. Based on the patient's AM-PAC score, and their current ADL deficits, it is recommended that the patient have 2-3 sessions per week of Occupational Therapy at d/c to increase the patient's independence. At this time, this patient demonstrates the endurance and safety to discharge home with home (home vs OP services) and a follow up treatment frequency of 2-3x/wk. Please see assessment section for further patient specific details. If patient discharges prior to next session this note will serve as a discharge summary. Please see below for the latest assessment towards goals.      HOME HEALTH CARE: LEVEL 3 SAFETY        -Initial home health evaluation to occur within 24-48 hours, in patient home    -Home health agency to establish plan of care for patient over 60 day period    -Medication Reconciliation    -PT/OT/Speech evaluations in home within 24-48 hours of discharge; including  -DME and home safety    -Frontload therapy 5 days, then 3x a week    -OT to evaluate if patient has [address] needs for personal care    - evaluation within 24-48 hours, includes evaluation of resources   and insurance to determine [place], [place], LTC, and Medicaid options    -PCP Visit scheduled within three to seven days of discharge Assessment: Discussed with OTR am pac score is 16 which indicates need for continued skilled OT to incresae [place] and decrease caregiver burden. Patient able to complete functional mobility with RW with Min A, slow gait, slight post lean noted, narrow SARAH BETH, no overt LOB noted. Mod A for lower body dressing. CGA for standing balance and toileting. If plan is for discharge to home patient will require 24 hour hands on assist and level 3 home OT to ensure a safe transistion to home. Cont with POC. Patient Diagnosis(es): There were no encounter diagnoses. has a past medical history of Arthritis, Depression, Hyperlipidemia, Kidney disease, and Pre-diabetes. has a past surgical history that includes Thyroidectomy, partial; Kidney transplant; Kidney donation; and cervical laminectomy (N/A, 1/14/2021). Restrictions  Restrictions/Precautions  Restrictions/Precautions: Fall Risk  Required Braces or Orthoses?: Yes  Required Braces or Orthoses  Cervical: [place] J  Position Activity Restriction  Other position/activity restrictions: 1-: Hemovac drain at cervical incision. [place] J on at all times per Dr. Tracy Torres except for meals and hygiene  Subjective   General  Chart Reviewed: Yes  Additional Pertinent Hx: per Dr Tracy Torres H&P note on 1/14/21: \" The patient is a 81-year-old with symptoms of myelopathy. She failed conservative treatments. MRI showed acquired canal stenosis,worse at C4-5, C5-6 with a significant posterior component contributingto the stenosis.   She elected to undergo posterior decompression andstabilization\"  Response to previous treatment: Patient reporting fatigue but able to participate  Family / Caregiver Present: Yes( present at end of session)  Referring Practitioner: Dr Tracy Torres  Diagnosis: cervical disc disease w/ myelopathy  Subjective Subjective: Patient seated in recliner chair upon arrival to room with PT. Agreeable to therapy. Reports pain 5/10, ice placed at end of session  General Comment  Comments: per RN ok for therapy      Orientation  Orientation  Overall Orientation Status: Within Functional Limits  Objective    ADL  Grooming: Contact guard assistance(standing in front of sink to wash hands)  LE Dressing: Moderate assistance(Mod A to thread briefs over feet, CGA for balance for over hips)  Toileting: Contact guard assistance(CGA for balance during gus care)        Balance  Sitting Balance: Stand by assistance(seated on commode)  Standing Balance: Contact guard assistance  Standing Balance  Activity: Stood for grooming tasks  Functional Mobility  Functional - Mobility Device: Rolling Walker  Activity: To/from bathroom  Assist Level: Minimal assistance  Functional Mobility Comments: Functional mobility with RW with CGA/Min A, slow gait, slight post lean, narrow SARAH BETH, no overt LOB noted  Toilet Transfers  Toilet - Technique: Ambulating  Equipment Used: Grab bars(comfort height commode)  Toilet Transfer: Minimal assistance  Toilet Transfers Comments: cues for hand placement  Bed mobility  Supine to Sit: Unable to assess  Sit to Supine: Unable to assess  Comment: Up in chair at start and end of session  Transfers  Sit to stand: Minimal assistance  Stand to sit: Minimal assistance  Transfer Comments: Min A for sit<>stand from recliner chair to RW to recliner chair cues for hand placement     Cognition  Overall Cognitive Status: Exceptions  Arousal/Alertness: Appropriate responses to stimuli  Following Commands: Follows one step commands with increased time; Follows one step commands with repetition  Attention Span: Attends with cues to redirect  Memory: Decreased recall of precautions  Safety Judgement: Decreased awareness of need for assistance;Decreased awareness of need for safety  Problem Solving: Assistance required to generate solutions Insights: Decreased awareness of deficits  Initiation: Requires cues for some  Sequencing: Requires cues for some  Cognition Comment: Patient more alert this date, responds to directions and follows commands with increased time     Assessment   Performance deficits / Impairments: Decreased functional mobility ; Decreased endurance;Decreased ADL status; Decreased balance;Decreased posture;Decreased strength;Decreased safe awareness;Decreased cognition  Assessment: Discussed with OTR am pac score is 16 which indicates need for continued skilled OT to incresae [place] and decrease caregiver burden. Patient able to complete functional mobility with RW with Min A, slow gait, slight post lean noted, narrow SARAH BETH, no overt LOB noted. Mod A for lower body dressing. CGA for standing balance and toileting. If plan is for discharge to home patient will require 24 hour hands on assist and level 3 home OT to ensure a safe transistion to home. Cont with POC. OT Education: OT Role;Plan of Care;Precautions; ADL Adaptive Strategies  REQUIRES OT FOLLOW UP: Yes  Activity Tolerance  Activity Tolerance: Patient Tolerated treatment well  Safety Devices  Safety Devices in place: Yes  Type of devices: Left in chair;Nurse notified;Call light within reach; Chair alarm in place;Gait belt        Plan   Plan  Times per week: 3-5  Specific instructions for Next Treatment: may need co tx, sponge bathing LB ADLs, instruct w/ A/E  Current Treatment Recommendations: Strengthening, Gait Training, Patient/Caregiver Education & Training, Equipment Evaluation, Education, & procurement, Balance Training, Functional Mobility Training, Cognitive Reorientation, Self-Care / ADL, Safety Education & Training  Plan Comment:  took off x 1 wk , will need  services    AM-PAC Score        AM-PAC Inpatient Daily Activity Raw Score: 16 (01/16/21 1055)  AM-PAC Inpatient ADL T-Scale Score : 35.96 (01/16/21 1055) ADL Inpatient CMS 0-100% Score: 53.32 (01/16/21 1055)  ADL Inpatient CMS G-Code Modifier : CK (01/16/21 1055)    Goals  Short term goals  Time Frame for Short term goals: by 3-5 sessions pt will complete: all goals ongoing  Short term goal 1: Feeding & grooming w/ set up  Short term goal 2: UB dressing w/ min A--suggested using loose fitting button front shirts  Short term goal 3: LB dressing & sponge bathing w/ min A using A/E  Short term goal 4: toileting w/ CGA  Short term goal 5: household t/f using RW w/ CGA  Long term goals  Long term goal 1: cont to address fam ed & DME needs-- Lashay Rivera made a list to purchase gait belt, elevated BS table, RTS + arms, reacher  Patient Goals   Patient goals : \"walk\"       Therapy Time   Individual Concurrent Group Co-treatment   Time In 1010         Time Out 1105         Minutes 55                 Electronically signed by Srinivas Ritter, [id number] on 1/16/2021 at 11:10 AM

## 2021-01-16 NOTE — PROGRESS NOTES
Patient requiring multiple prompts to use stedy to go to chair. Patient stating she is still incontinent and this has been an ongoing problem at home \"just not to this extent\". Lubertha Zebulon remains in place to maintain adequate intake and output measurements. Patient able to use incentive spirometer with prompting. Ice pack in place. Patient up to chair in no apparent distress.

## 2021-01-16 NOTE — PROGRESS NOTES
Physical Therapy  Facility/Department: [address] ORTHOPEDICS  Daily Treatment Note  NAME: Heath Raymond  : 1945  MRN: [id number]    Date of Service: 2021    Discharge Recommendations:  Continue to assess pending progress   PT Equipment Recommendations  Equipment Needed: No  Other:  reports has wheeled walker   Heath Raymond scored a 13/24 on the AM-PAC short mobility form. Current research shows that an AM-PAC score of 17 or less is typically not associated with a discharge to the patient's home setting. Based on the patient's AM-PAC score and their current functional mobility deficits, it is recommended that the patient have 5-7 sessions per week of Physical Therapy at d/c to increase the patient's independence. At this time, this patient demonstrates the endurance, and/or tolerance for 3 hours of therapy each day, with a treatment frequency of 5-7x/wk. Please see assessment section for further patient specific details. If patient discharges prior to next session this note will serve as a discharge summary. Please see below for the latest assessment towards goals. .Assessment   Body structures, Functions, Activity limitations: Decreased functional mobility ; Decreased safe awareness;Decreased cognition;Decreased strength;Decreased endurance; Increased pain;Decreased balance;Decreased sensation;Decreased ROM Assessment: 77year old female with pre op diagnosis of cervical disc disorder with myelopathy, to hospital 1/14/2021 for POSTERIOR DECOMPRESSION CERVICAL LAMINECTOMY C4, C5, C6 WITH LATERAL MASS RODS AND SCREWS C4-C7 performed by Chirag Agosto MD.  Patient unable to provide prior status info 1-, but it appears as if ahe was experiencing multiple falls, and required assist for ADLs, due to dysfunction in B LEs, patient states primarily L LE. Status 1-. This afternoon, patient with Min/moderate A  with cues for transfers, and Min A for 25' wh walker ambulation. Patient demonstrates limited endurance, pain and decreased balance. Patient needed increase cues for safety this afternoon. Patient continues to need safety cues and assistance with wheeled walker . Ayana Calderon At current presentation she will require ongoing skilled therapy to maximize functional capability and safety.  would like patient to be D/C home from hospital to home with another daughter assisting, Will continue to assess safety. If home 24 hour assist with home therapy. at this time patient will need  continue therapy prior to D/C home. Treatment Diagnosis: Impaired functional mobility  PT Education: Orientation;Goals;Transfer Training;Gait Training;Equipment; Functional Mobility Training;Precautions;General Safety  Patient Education: instructed  with removal and fit of [place] J, per Dr. Hortencia Gracia to be on at all times except eating and hygiene  REQUIRES PT FOLLOW UP: Yes  Activity Tolerance  Activity Tolerance: Patient limited by pain; Patient limited by fatigue;Patient limited by endurance     Patient Diagnosis(es): There were no encounter diagnoses. has a past medical history of Arthritis, Depression, Hyperlipidemia, Kidney disease, and Pre-diabetes. has a past surgical history that includes Thyroidectomy, partial; Kidney transplant; Kidney donation; and cervical laminectomy (N/A, 1/14/2021).     Restrictions Restrictions/Precautions  Restrictions/Precautions: Fall Risk  Required Braces or Orthoses?: Yes  Required Braces or Orthoses  Cervical: [place] SUSAN  Position Activity Restriction  Other position/activity restrictions: 1-: Hemovac drain at cervical incision. [place] DAVIS on at all times per Dr. Tez Conti except for meals and hygiene  Subjective   General  Chart Reviewed: Yes  Additional Pertinent Hx: 77year old female with pre op diagnosis of cervical disc disorder with myelopathy, to hospital 1/14/2021 for POSTERIOR DECOMPRESSION CERVICAL LAMINECTOMY C4, C5, C6 WITH LATERAL MASS RODS AND SCREWS C4-C7 performed by Silas Moser MD. Other PMHx as noted, including kidney donation to family memeber then patient's sole kidney failing, requiring patient to have kidney transplant. Response To Previous Treatment: Patient with no complaints from previous session. Family / Caregiver Present: Yes( towards end of therapy session)  Referring Practitioner: Silas Moser MD  Subjective  Subjective: Patient c/o of less pain, difficulty recalling recent events, did not recall how many times up this AM  General Comment  Comments: Patient in recliner , slow to process          Orientation  Orientation  Overall Orientation Status: Impaired  Orientation Level: Oriented to place; Disoriented to time;Oriented to person(patient knew hospital but not name, stated [facility], decreased STM)  Cognition   Cognition  Overall Cognitive Status: Exceptions  Arousal/Alertness: Appropriate responses to stimuli  Following Commands: Follows one step commands with increased time; Follows one step commands with repetition  Attention Span: Attends with cues to redirect  Memory: Decreased recall of precautions  Safety Judgement: Decreased awareness of need for assistance;Decreased awareness of need for safety  Problem Solving: Assistance required to generate solutions  Insights: Decreased awareness of deficits Initiation: Requires cues for some  Sequencing: Requires cues for some  Cognition Comment: Patient more alert this date, responds to directions and follows commands with increased time  Objective   Bed mobility  Sit to Supine: Minimal assistance; Moderate assistance(assist with trunk)  Scooting: Minimal assistance(with cues)  Transfers  Sit to Stand: Minimal Assistance; Moderate Assistance(Verbal and tactile cues for technique from recliner)  Stand to sit: Minimal Assistance; Moderate Assistance(Cues for positioning to BS chair completely, hand placement, and hip/knee flexion for sit.)  Comment: toilet transfer with moderate tominimal assist with cues for hand placement  Ambulation  Ambulation?: Yes  Ambulation 1  Surface: level tile  Device: Rolling Walker  Assistance: Minimal assistance  Quality of Gait: Shortened steps, patient req's cues to stay on task throughout. Min posterior lean with occasional assist to steer wheeled walker  Gait Deviations: Decreased step length;Decreased step height  Distance: 25' , 15'  Comments: Patient did need cues for safety and occasional guidance of wheeled walker  Stairs/Curb  Stairs?: No     Balance  Sitting - Static: Fair  Sitting - Dynamic: Poor;+  Standing - Static: Fair(with wheeled walker)  Standing - Dynamic: Fair;-(fait with wheeled walker, occasional posterior lean)  Exercises  Knee Long Arc Quad: 10  Ankle Pumps: 20  Comments: Instructed to perform the above exercises         Other Activities: Other (see comment)  Comment: Patient requested to return to bed mod/minimal assist for sit to supine, pillow x 1 position for comfort, given call light and bed alarm, Prior to this, patient to toilet mod/minimal assist for transfer, patient saturated through depends, cues to stop wiping self, perseverates to stop pulling at TP roll.                AM-PAC Score  AM-PAC Inpatient Mobility Raw Score : 13 (01/16/21 1448)  AM-PAC Inpatient T-Scale Score : 36.74 (01/16/21 1448) Mobility Inpatient CMS 0-100% Score: 64.91 (01/16/21 1448)  Mobility Inpatient CMS G-Code Modifier : CL (01/16/21 1448)          Goals  Short term goals  Time Frame for Short term goals: By acute DC. Short term goal 1: Bed mobility Min A  Short term goal 2: Transfers Min A- met 1/16 increase to CGA for transfers  Short term goal 3: Wh walker amb 28' Min A  Patient Goals   Patient goals : Does not state, other than pain relief. Plan    Plan  Times per week: 5  Current Treatment Recommendations: Transfer Training, Functional Mobility Training, Gait Training, Cognitive Reorientation  Safety Devices  Type of devices: Call light within reach, Gait belt, Patient at risk for falls, Nurse notified, Bed alarm in place, Left in bed(MORGAN Quarles informed. Thin Ice pack placed under posterior portion of cervical collar. Step stool placed under feet.  [place] rolls alongside patient thighs for UE support.)  Restraints  Initially in place: No     Therapy Time   Individual Concurrent Group Co-treatment   Time In 1400         Time Out 1430         Minutes 30         Timed Code Treatment Minutes: 25[address] [facility] [address] Rianna Gunn

## 2021-01-16 NOTE — PROGRESS NOTES
POD 2 posterior cervical laminectomy and fusion. Afebrile. Vitals stable  Ambulating with walker ant PT to bathroom. Requires instruction and assist.  Wearing collar  Drain 35, 15    Decrease pain meds, discussed with RN  continue Pt OT  Continue drain  Continue abx    Patient to be evaluated for home vs skilled nursing vs acute rehab.  at bedside. Answered questions.

## 2021-01-16 NOTE — PROGRESS NOTES
Physical Therapy  Facility/Department: [facility]THOPEDICS  Daily Treatment Note  NAME: Yury Mckeon  : 1945  MRN: [id number]    Date of Service: 2021    Discharge Recommendations:  Continue to assess pending progress   PT Equipment Recommendations  Equipment Needed: No  Other:  reports has wheeled walker  Yury Mckeon scored a 14/24 on the AM-PAC short mobility form. Current research shows that an AM-PAC score of 18 or greater is typically associated with a discharge to the patient's home setting.  would like patient to be D/C home with assist of family. Patient will need 24 hour hands on assist at D/C. It is recommended that the patient have 2-3 sessions per week of Physical Therapy at d/c to increase the patient's independence. At this time, this patient demonstrates the endurance and safety to discharge home with 24 hour hand on assist of family with  home therapy and a follow up treatment frequency of 2-3x/wk. Please see assessment section for further patient specific details. If patient discharges prior to next session this note will serve as a discharge summary. Please see below for the latest assessment towards goals. Assessment   Body structures, Functions, Activity limitations: Decreased functional mobility ; Decreased safe awareness;Decreased cognition;Decreased strength;Decreased endurance; Increased pain;Decreased balance;Decreased sensation;Decreased ROM Assessment: 77year old female with pre op diagnosis of cervical disc disorder with myelopathy, to hospital 1/14/2021 for POSTERIOR DECOMPRESSION CERVICAL LAMINECTOMY C4, C5, C6 WITH LATERAL MASS RODS AND SCREWS C4-C7 performed by Jacky Jensen MD.  Patient unable to provide prior status info 1-, but it appears as if yanna was experiencing multiple falls, and required assist for ADLs, due to dysfunction in B LEs, patient states primarily L LE. Status 1-. Patient with Min A  with  cues  for transfers, and Min A for 25' wh walker ambulation. Patient demonstrates limited endurance, pain and decreased balance. Patient did better then yesterday with alertness and mobility. Patient continues to need safety cues and assistance with wheeled walker . Elver Wick At current presentation she will require ongoing skilled therapy to maximize functional capability and safety.  would like patient to be D/C home from hospital to home with another daughter assisting, will continue to assess safety. If home 24 hour assist with home therapy. Treatment Diagnosis: Impaired functional mobility  PT Education: Orientation;Goals;Transfer Training;Gait Training;Equipment; Functional Mobility Training;Precautions;General Safety  Patient Education: instructed  with removal and fit of [place] J, per Dr. Kathy Winkler to be on at all times except eating and hygiene  REQUIRES PT FOLLOW UP: Yes  Activity Tolerance  Activity Tolerance: Patient limited by pain; Patient limited by fatigue;Patient limited by endurance     Patient Diagnosis(es): There were no encounter diagnoses. has a past medical history of Arthritis, Depression, Hyperlipidemia, Kidney disease, and Pre-diabetes. has a past surgical history that includes Thyroidectomy, partial; Kidney transplant; Kidney donation; and cervical laminectomy (N/A, 1/14/2021).     Restrictions  Restrictions/Precautions  Restrictions/Precautions: Fall Risk Required Braces or Orthoses?: Yes  Required Braces or Orthoses  Cervical: [place] J  Position Activity Restriction  Other position/activity restrictions: 1-: Hemovac drain at cervical incision. [place] SUSAN on at all times per Dr. Parmjit Gonzalez except for meals and hygiene  Subjective   General  Chart Reviewed: Yes  Additional Pertinent Hx: 77year old female with pre op diagnosis of cervical disc disorder with myelopathy, to hospital 1/14/2021 for POSTERIOR DECOMPRESSION CERVICAL LAMINECTOMY C4, C5, C6 WITH LATERAL MASS RODS AND SCREWS C4-C7 performed by Poppy sOborne MD. Other PMHx as noted, including kidney donation to family memeber then patient's sole kidney failing, requiring patient to have kidney transplant. Response To Previous Treatment: Not applicable  Family / Caregiver Present: Yes( towards end of therapy session)  Referring Practitioner: Poppy Osborne MD  Subjective  Subjective: Patient reports pain 5/10 on scale of 1- 10, hesitant to get up but with encouragement agreed to participate.  reports would like to take patient home with daughter in town to assist.  states been sleeping in [address]  Comments: Patient more coherant this AM          Orientation  Orientation  Overall Orientation Status: Impaired  Orientation Level: Oriented to place; Disoriented to time;Oriented to person(patient knew hospital but not name, stated it [facility])  Cognition   Cognition  Overall Cognitive Status: Exceptions  Arousal/Alertness: Appropriate responses to stimuli  Following Commands: Follows one step commands with increased time; Follows one step commands with repetition  Attention Span: Attends with cues to redirect  Memory: Decreased recall of precautions  Safety Judgement: Decreased awareness of need for assistance;Decreased awareness of need for safety  Problem Solving: Assistance required to generate solutions  Insights: Decreased awareness of deficits Initiation: Requires cues for some  Sequencing: Requires cues for some  Cognition Comment: Patient more alert this date, responds to directions and follows commands with increased time  Objective      Transfers  Sit to Stand: Minimal Assistance(From EOB. Verbal and tactile cues for technique)  Stand to sit: Minimal Assistance(Cues for positioning to BS chair completely, hand placement, and hip/knee flexion for sit.)  Ambulation  Ambulation?: Yes  Ambulation 1  Surface: level tile  Device: Rolling Walker  Assistance: Minimal assistance  Quality of Gait: Shortened steps, patient req's cues to stay on task throughout. Min posterior lean with occasional assist to steer wheeled walker  Gait Deviations: Decreased step length;Decreased step height  Distance: 25' x 2  Comments: Patient did need cues for safety and occasional guidance of wheeled walker  Stairs/Curb  Stairs?: No     Balance  Sitting - Static: Fair  Sitting - Dynamic: Poor;+  Standing - Static: Fair(with wheeled walker)  Standing - Dynamic: Fair;-(fait with wheeled walker, occasional posterior lean)  Exercises  Knee Long Arc Quad: 10  Ankle Pumps: 20  Comments: Instructed to perform the above exercises         Other Activities: Other (see comment)  Comment: co treat with OT- Patient needed minimal assist for toilet transfers with assist to luna/doff depends, stood with CGA for pericare, patient to sink with wheeled walker , stood to wash hands with firm CGA, minimal assist to turn and guide wheeled walker           AM-PAC Score  AM-PAC Inpatient Mobility Raw Score : 14 (01/16/21 1117)  AM-PAC Inpatient T-Scale Score : 38.1 (01/16/21 1117)  Mobility Inpatient CMS 0-100% Score: 61.29 (01/16/21 1117)  Mobility Inpatient CMS G-Code Modifier : CL (01/16/21 1117)          Goals  Short term goals  Time Frame for Short term goals: By acute DC.   Short term goal 1: Bed mobility Min A  Short term goal 2: Transfers Min A- met 1/16 increase to CGA for transfers Short term goal 3: Wh walker amb 28' Min A  Patient Goals   Patient goals : Does not state, other than pain relief. Plan    Plan  Times per week: 5  Current Treatment Recommendations: Transfer Training, Functional Mobility Training, Gait Training, Cognitive Reorientation  Safety Devices  Type of devices: Call light within reach, Chair alarm in place, Gait belt, Patient at risk for falls, Left in chair, Nurse notified(MORGAN Quarles informed. Thin Ice pack placed under posterior portion of cervical collar. Step stool placed under feet.  [place] rolls alongside patient thighs for UE support.)     Therapy Time   Individual Concurrent Group Co-treatment   Time In 1010         Time Out 1055         Minutes 45         Timed Code Treatment Minutes: [address]15 Prosper Ruiz, PT # [id number]

## 2021-01-17 VITALS
WEIGHT: 151.24 LBS | DIASTOLIC BLOOD PRESSURE: 72 MMHG | SYSTOLIC BLOOD PRESSURE: 138 MMHG | BODY MASS INDEX: 25.2 KG/M2 | TEMPERATURE: 98.5 F | HEART RATE: 96 BPM | RESPIRATION RATE: 12 BRPM | OXYGEN SATURATION: 96 % | HEIGHT: 65 IN

## 2021-01-17 PROCEDURE — 6370000000 HC RX 637 (ALT 250 FOR IP): Performed by: NEUROLOGICAL SURGERY

## 2021-01-17 PROCEDURE — 97530 THERAPEUTIC ACTIVITIES: CPT

## 2021-01-17 PROCEDURE — 6360000002 HC RX W HCPCS: Performed by: NEUROLOGICAL SURGERY

## 2021-01-17 RX ORDER — TRAMADOL HYDROCHLORIDE 50 MG/1
50 TABLET ORAL
Qty: 42 TABLET | Refills: 0 | Status: SHIPPED | OUTPATIENT
Start: 2021-01-17 | End: 2021-01-24

## 2021-01-17 RX ORDER — METHOCARBAMOL 750 MG/1
750 TABLET, FILM COATED ORAL 3 TIMES DAILY
Qty: 30 TABLET | Refills: 0 | Status: SHIPPED | OUTPATIENT
Start: 2021-01-17 | End: 2021-01-27

## 2021-01-17 RX ADMIN — ACETAMINOPHEN 650 MG: 325 TABLET ORAL at 06:54

## 2021-01-17 RX ADMIN — TROSPIUM CHLORIDE 20 MG: 20 TABLET, FILM COATED ORAL at 06:54

## 2021-01-17 RX ADMIN — FLUOXETINE HYDROCHLORIDE 10 MG: 10 TABLET ORAL at 07:42

## 2021-01-17 RX ADMIN — BISACODYL 5 MG: 5 TABLET, COATED ORAL at 07:42

## 2021-01-17 RX ADMIN — MYCOPHENOLATE MOFETIL 250 MG: 250 CAPSULE ORAL at 07:42

## 2021-01-17 RX ADMIN — METHOCARBAMOL TABLETS 750 MG: 750 TABLET, COATED ORAL at 07:42

## 2021-01-17 ASSESSMENT — PAIN SCALES - GENERAL: PAINLEVEL_OUTOF10: 0

## 2021-01-17 ASSESSMENT — PAIN DESCRIPTION - PAIN TYPE: TYPE: ACUTE PAIN

## 2021-01-17 ASSESSMENT — PAIN DESCRIPTION - ONSET: ONSET: ON-GOING

## 2021-01-17 ASSESSMENT — PAIN DESCRIPTION - LOCATION: LOCATION: GENERALIZED

## 2021-01-17 ASSESSMENT — PAIN DESCRIPTION - PROGRESSION: CLINICAL_PROGRESSION: GRADUALLY WORSENING

## 2021-01-17 ASSESSMENT — PAIN DESCRIPTION - DESCRIPTORS: DESCRIPTORS: ACHING

## 2021-01-17 NOTE — PROGRESS NOTES
Pt AAO x4, forgetful. C/o pain in the back of her neck 5/10 on pain scale. Medicated with PRN Oxycodone. Ice pack also applied to posterior neck. Dressing to posterior neck CDI. Hemovac drain with serosanguinous drainage. C collar in place. Assessment completed and charted. Pt denies further needs. Call light in reach. Will monitor.

## 2021-01-17 NOTE — PROGRESS NOTES
Patient discharged with belongings and durable medical equipment with family via wheelchair via [address] transportation to private residence. Patient given discharge instructions, patient verbalized understanding, no questions at this time. Prescriptions given to patient. IV D/Cd patient tolerated well, no complications.        Electronically signed by Nataliia Del Toro RN on 1/17/2021 at 1:11 PM

## 2021-01-17 NOTE — PLAN OF CARE
Problem: Falls - Risk of:  Goal: Will remain free from falls  Description: Will remain free from falls  Outcome: Ongoing  Goal: Absence of physical injury  Description: Absence of physical injury  Outcome: Ongoing   Fall risk assessment completed every shift. All precautions in place. Pt has call light within reach at all times. Room clear of clutter. Pt aware to call for assistance when getting up. Problem: Skin Integrity:  Goal: Will show no infection signs and symptoms  Description: Will show no infection signs and symptoms  Outcome: Ongoing  Goal: Absence of new skin breakdown  Description: Absence of new skin breakdown  Outcome: Ongoing   Skin assessment completed every shift. Pt assessed for incontinence, appropriate wipes used prn. Pt encouraged to turn/rotate every 2 hours. Assistance provided if pt unable to do so themselves. Problem: Pain:  Goal: Pain level will decrease  Description: Pain level will decrease  Outcome: Ongoing  Goal: Control of acute pain  Description: Control of acute pain  Outcome: Ongoing  Goal: Control of chronic pain  Description: Control of chronic pain  Outcome: Ongoing   Pain/discomfort being managed with PRN analgesics per MD orders. Pt able to express presence and absence of pain and rate pain appropriately using numerical scale.

## 2021-01-17 NOTE — CARE COORDINATION
1/17 met with patient and her  at bedside to discuss dc plans-- patient lives at home with her  --she plans to return home at [facility] -  will be available to assist as needed with her care. Discussed MD order for home care and [facility] no preference for agency-- [facility] [place]. Spoke w/ Peterson Camera with [facility] can accept - they will pull orders,etc from [address] [place] [address] Rd. Discussed equipment needs - denies needs-- pt  states he is purchasing a transport chair and a new gait belt. they have a walker and RTS w/ handles.  to transport home at discharge. The Plan for Transition of Care is related to the following treatment goals: home    The Patient and/or patient representative  was provided with a choice of provider and agrees   with the discharge plan. [x] Yes [] No    Freedom of choice list was provided with basic dialogue that supports the patient's individualized plan of care/goals, treatment preferences and shares the quality data associated with the providers.  [x] Yes [] No      Electronically signed by Kelley Wagner on 1/17/2021 at 11:43 AM  #[phone]

## 2021-01-17 NOTE — PLAN OF CARE
Problem: Falls - Risk of:  Goal: Will remain free from falls  Description: Will remain free from falls  1/17/2021 0717 by Billy Thomason RN  Outcome: Ongoing  Note: Fall risk assessment completed . Fall precautions in place, bed alarm on, side rails 2/4 up, call light in reach, educated pt on calling for assistance when needed, room clear of clutter. Pt verbalized understanding. 1/17/2021 0325 by Demetrio Robertson RN  Outcome: Ongoing  Goal: Absence of physical injury  Description: Absence of physical injury  1/17/2021 0717 by Billy Thomason RN  Outcome: Ongoing  1/17/2021 0325 by Demetrio Robertson RN  Outcome: Ongoing     Problem: Skin Integrity:  Goal: Will show no infection signs and symptoms  Description: Will show no infection signs and symptoms  1/17/2021 0717 by Billy Thomason RN  Outcome: Ongoing  Note: Patient is alert and oriented, afebrile, has manageable complaints of pain, skin is intact and appropriate for ethnicity in color    1/17/2021 0325 by Demetrio Robertson RN  Outcome: Ongoing  Goal: Absence of new skin breakdown  Description: Absence of new skin breakdown  1/17/2021 0717 by Billy Thomason RN  Outcome: Ongoing  Note: Ruddy score assessed. Patient able to ambulate and turn self and repositioned patient Q2H and assessed skin. Educated patient on importance of repositioning to prevent skin issues. 1/17/2021 0325 by Demetrio Robertson RN  Outcome: Ongoing     Problem: Pain:  Goal: Pain level will decrease  Description: Pain level will decrease  1/17/2021 0717 by Billy Thomason RN  Outcome: Ongoing  Note: Pain /discomfort being managed with PRN analgesics per MD orders. Patient able to express presence and absence of pain and rate pain appropriately using numerical scale.      1/17/2021 0325 by Demetrio Robertson RN  Outcome: Ongoing  Goal: Control of acute pain  Description: Control of acute pain  1/17/2021 0717 by Billy Thomasno RN  Outcome: Ongoing 1/17/2021 0325 by Matt Ortega RN  Outcome: Ongoing  Goal: Control of chronic pain  Description: Control of chronic pain  1/17/2021 0717 by Nataliia Del Toro RN  Outcome: Ongoing  1/17/2021 0325 by Matt Ortega RN  Outcome: Ongoing

## 2021-01-17 NOTE — PROGRESS NOTES
Physical Therapy  Facility/Department: [address] ORTHOPEDICS  Daily Treatment Note  NAME: Deniz Clayton  : 1945  MRN: [id number]    Date of Service: 2021    Discharge Recommendations:  24 hour supervision or assist, Patient would benefit from continued therapy after discharge, Home with Home health PT, S Level 3   PT Equipment Recommendations  Equipment Needed: Yes  Mobility Devices: Wheelchair  Wheelchair: Standard  Other: Will also need BSC  Deniz Clayton scored a 16/24 on the AM-PAC short mobility form. Current research shows that an AM-PAC score of 18 or greater is typically associated with a discharge to the patient's home setting. Based on the patient's AM-PAC score and their current functional mobility deficits, it is recommended that the patient have 2-3 sessions per week of Physical Therapy at d/c to increase the patient's independence. At this time, this patient demonstrates the endurance and safety to discharge home with HHPT and a follow up treatment frequency of 2-3x/wk. Please see assessment section for further patient specific details. If patient discharges prior to next session this note will serve as a discharge summary. Please see below for the latest assessment towards goals.    HOME HEALTH CARE: LEVEL 3 SAFETY        -Initial home health evaluation to occur within 24-48 hours, in patient home    -Home health agency to establish plan of care for patient over 60 day period    -Medication Reconciliation    -PT/OT/Speech evaluations in home within 24-48 hours of discharge; including  -DME and home safety    -Frontload therapy 5 days, then 3x a week    -OT to evaluate if patient has [address] needs for personal care    - evaluation within 24-48 hours, includes evaluation of resources   and insurance to determine [place], [place], LTC, and Medicaid options    -PCP Visit scheduled within three to seven days of discharge Abraham Cancer was evaluated today and a DME order was entered for a standard wheelchair because she requires this to successfully complete daily living tasks of ambulating. A standard manual wheelchair is necessary due to patient's impaired ambulation and mobility restrictions and would be unable to resolve these daily living tasks using a cane or walker. The patient is capable of using a standard wheelchair safely in their home and can maneuver within their home with adequate access. There is a caregiver available to provide necessary assistance. The need for this equipment was discussed with the patient and she understands, is in agreement, and has not expressed an unwillingness to use the wheelchair. Abraham Cancer requires a bedside commode due to being confined to a single room, and is physically incapable of utilizing regular toilet facilities. Current body weight is Weight: 151 lb 3.8 oz (68.6 kg). Assessment   Body structures, Functions, Activity limitations: Decreased functional mobility ; Decreased safe awareness;Decreased cognition;Decreased strength;Decreased endurance; Increased pain;Decreased balance;Decreased sensation;Decreased ROM Assessment: 77year old female with pre op diagnosis of cervical disc disorder with myelopathy, to hospital 1/14/2021 for POSTERIOR DECOMPRESSION CERVICAL LAMINECTOMY C4, C5, C6 WITH LATERAL MASS RODS AND SCREWS C4-C7 performed by Domonique Ramírez MD.  Patient unable to provide prior status info 1-, but it appears as if yanna was experiencing multiple falls, and required assist for ADLs, due to dysfunction in B LEs, patient states primarily L LE. Status 1-: bed mobility with CGA, sit<>stand with CGA/min A, ambulation up to 40' with RW and CGA (min A for retropulsion). Pt with improvement in mobility this date - not drowsy. Anticipate she would be able to return home with strict 24hr supv (claims  is staying home first week then maybe son for second week). She will need level 3 HHPT, a wheelchair for greater than room distance ambulation (reports spouse is already getting), gait belt (reports spouse has 2), and a BSC. Treatment Diagnosis: Impaired functional mobility  PT Education: Orientation;Goals;Transfer Training;Gait Training;Equipment; Functional Mobility Training;Precautions;General Safety  Activity Tolerance  Activity Tolerance: Patient limited by pain; Patient limited by endurance; Patient Tolerated treatment well     Patient Diagnosis(es): There were no encounter diagnoses. has a past medical history of Arthritis, Depression, Hyperlipidemia, Kidney disease, and Pre-diabetes. has a past surgical history that includes Thyroidectomy, partial; Kidney transplant; Kidney donation; and cervical laminectomy (N/A, 1/14/2021). Restrictions  Restrictions/Precautions  Restrictions/Precautions: Fall Risk  Required Braces or Orthoses?: Yes  Required Braces or Orthoses  Cervical: [place] J  Position Activity Restriction  Other position/activity restrictions: 1-: Hemovac drain at cervical incision.  Charles DAVIS on at all times per Dr. Bravo Memory except for meals and hygiene     Subjective General  Chart Reviewed: Yes  Additional Pertinent Hx: 77year old female with pre op diagnosis of cervical disc disorder with myelopathy, to hospital 1/14/2021 for POSTERIOR DECOMPRESSION CERVICAL LAMINECTOMY C4, C5, C6 WITH LATERAL MASS RODS AND SCREWS C4-C7 performed by Lotus Harkins MD. Other PMHx as noted, including kidney donation to family memeber then patient's sole kidney failing, requiring patient to have kidney transplant. Response To Previous Treatment: Patient with no complaints from previous session. Referring Practitioner: Lotus Harkins MD  Subjective  Subjective: Pt is agreeable to PT - c/o little pain today. Recalls that she felt overmedicated/too drowsy yesterday. Orientation  Orientation  Overall Orientation Status: Within Functional Limits     Cognition   Cognition  Overall Cognitive Status: Exceptions  Arousal/Alertness: Appropriate responses to stimuli  Following Commands:  Follows all commands without difficulty  Attention Span: Appears intact  Memory: Appears intact  Safety Judgement: Decreased awareness of need for assistance;Decreased awareness of need for safety  Insights: Decreased awareness of deficits  Initiation: Does not require cues  Sequencing: Requires cues for some  Cognition Comment: Pt continues to improve in cognition     Objective   Bed mobility  Supine to Sit: Contact guard assistance(HOB elevated)  Sit to Supine: Unable to assess(in recliner at end of session)  Scooting: Contact guard assistance  Transfers  Sit to Stand: Contact guard assistance;Minimal Assistance  Stand to sit: Contact guard assistance;Minimal Assistance  Comment: slight initial posterior lean  Ambulation  Ambulation?: Yes  Ambulation 1  Surface: level tile  Device: Rolling Walker  Assistance: Contact guard assistance;Minimal assistance  Quality of Gait: intermittent cues for walker poximity  Gait Deviations: Slow Idania;Decreased step length;Decreased step height Distance: 21' + 40'  Comments: Pt with improvement in overall gait - slightly unsteady with retropulsion. Stairs/Curb  Stairs?: No     Balance  Posture: Fair  Sitting - Static: Good  Sitting - Dynamic: Good;-  Standing - Static: [email or web address])  Standing - Dynamic: [email or web address])        AM-PAC Score  AM-PAC Inpatient Mobility Raw Score : 16 (01/17/21 1039)  AM-PAC Inpatient T-Scale Score : 40.78 (01/17/21 1039)  Mobility Inpatient CMS 0-100% Score: 54.16 (01/17/21 1039)  Mobility Inpatient CMS G-Code Modifier : CK (01/17/21 1039)          Goals  Short term goals  Time Frame for Short term goals: By acute DC. Short term goal 1: Bed mobility Min A - MET 1/17/21  Short term goal 2: Transfers Min A- met 1/16 increase to CGA for transfers  Short term goal 3: Wh walker amb 35' Min A - MET 1/17/21  Patient Goals   Patient goals : Does not state, other than pain relief. Plan    Plan  Times per week: 5-7x/weeks  Current Treatment Recommendations: Transfer Training, Functional Mobility Training, Gait Training, Cognitive Reorientation, Neuromuscular Re-education, Strengthening, Balance Training, Patient/Caregiver Education & Training, Safety Education & Training, Equipment Evaluation, Education, & procurement  Safety Devices  Type of devices:  All fall risk precautions in place, Call light within reach, Gait belt, Patient at risk for falls, Left in chair, Chair alarm in place, Nurse notified  Restraints  Initially in place: No     Therapy Time   Individual Concurrent Group Co-treatment   Time In 0955         Time Out 1035         Minutes 40         Timed Code Treatment Minutes: Joi 64, PT

## 2021-01-17 NOTE — DISCHARGE INSTR - COC
Continuity of Care Form    Patient Name: Vinod Tripathi   :  1945  MRN:  [id number]    Admit date:  2021  Discharge date:  ***    Code Status Order: Full Code   Advance Directives:   Advance Care Flowsheet Documentation       Date/Time Healthcare Directive Type of Healthcare Directive Copy in [address] Agent's Name Healthcare Agent's Phone Number    21 3040  Yes, patient has an advance directive for healthcare treatment    No, copy requested from family -- -- --    21 9689  Yes, patient has an advance directive for healthcare treatment  Durable power of  for health care;Living will  No, copy requested from family -- -- --            Admitting Physician:  Raz Morales MD  PCP: Chidi Stanton    Discharging Nurse: [facility] Unit/Room#: [id number]-9583/3129-01  Discharging Unit Phone Number: ***    Emergency Contact:   Extended Emergency Contact Information  Primary Emergency Contact: Ken Vazquez, [address] Phone: [phone]  Relation: Spouse    Past Surgical History:  Past Surgical History:   Procedure Laterality Date    CERVICAL LAMINECTOMY N/A 2021    POSTERIOR DECOMPRESSION CERVICAL LAMINECTOMY C4, C5, C6 WITH LATERAL MASS RODS AND SCREWS C4-C7 performed by Raz Morales MD at [facility] DONATION      to patient mother   [address], PARTIAL         Immunization History: There is no immunization history on file for this patient.     Active Problems:  Patient Active Problem List   Diagnosis Code    Cervical disc disease with myelopathy M50.00       Isolation/Infection:   Isolation            No Isolation          Patient Infection Status       None to display            Nurse Assessment:  Last Vital Signs: /80   Pulse 98   Temp 97.6 °F (36.4 °C) (Oral)   Resp 12   Ht 5' 5\" (1.651 m)   Wt 151 lb 3.8 oz (68.6 kg)   SpO2 92%   BMI 25.17 kg/m² Last documented pain score (0-10 scale): Pain Level: 0  Last Weight:   Wt Readings from Last 1 Encounters:   21 151 lb 3.8 oz (68.6 kg)     Mental Status:  {IP PT MENTAL STATUS::::0}    IV Access:  [address] IV ACCESS:088691194:::0}    Nursing Mobility/ADLs:  Walking   {CHP DME ADLs:284316065:::0}  Transfer  {CHP DME ADLs:513725727:::0}  Bathing  {CHP DME ADLs:582586635:::0}  Dressing  {CHP DME ADLs:274135662:::0}  Toileting  {CHP DME ADLs:277607255:::0}  Feeding  {CHP DME ADLs:123248005:::0}  Med Admin  {CHP DME ADLs:292571644:::0}  Med Delivery   { HEATHER MED Delivery:210409611:::0}    Wound Care Documentation and Therapy:        Elimination:  Continence:   · Bowel: {YES / AQ:44975}  · Bladder: {YES / CM:16953}  Urinary Catheter: {Urinary Catheter:130560560:::0}   Colostomy/Ileostomy/Ileal Conduit: {YES / GN:24367}       Date of Last BM: ***    Intake/Output Summary (Last 24 hours) at 2021 1045  Last data filed at 2021 0850  Gross per 24 hour   Intake 950 ml   Output 30 ml   Net 920 ml     I/O last 3 completed shifts:   In: 0 [P.O.:540; IV Piggyback:50]  Out: 1225 [Urine:1500; Drains:45]    Safety Concerns:     [address] Safety Concerns:481915138:::0}    Impairments/Disabilities:      [address] Impairments/Disabilities:118624997:::0}    Nutrition Therapy:  Current Nutrition Therapy:   [address] Diet List:572498196:::0}    Routes of Feeding: {CHP DME Other Feedings:332455868:::0}  Liquids: {Slp liquid thickness:52921}  Daily Fluid Restriction: {CHP DME Yes amt example:040104887:::0}  Last Modified Barium Swallow with Video (Video Swallowing Test): {Done Not Done PZRM:255974890:::8}    Treatments at the Time of Hospital Discharge:   Respiratory Treatments: ***  Oxygen Therapy:  {Therapy; copd oxygen:52290:::0}  Ventilator:    { KIRSTEN Vent List:859520283:::0}    Rehab Therapies: {THERAPEUTIC INTERVENTION:7980634057}  Weight Bearing Status/Restrictions: {[facility] Weight [facility]:::0} Other Medical Equipment (for information only, NOT a DME order):  {EQUIPMENT:927080772}  Other Treatments: ***    Patient's personal belongings (please select all that are sent with patient):  {CHP DME Belongings:090722764:::0}    RN SIGNATURE:  {Esignature:271219715:::0}    CASE MANAGEMENT/SOCIAL WORK SECTION    Inpatient Status Date: 1/14/2021    Readmission Risk Assessment Score:  Readmission Risk              Risk of Unplanned Readmission:        10           Discharging to Facility/ Agency   · Name: [facility]  ·  Address:  · Phone:  · Fax:    / signature: Electronically signed by Zena Jimenez on 1/17/21 at 11:39 AM EST    PHYSICIAN SECTION    Prognosis: {Prognosis:7234715062:::0}    Condition at Discharge: Trevon Jiménez Patient Condition:007286194:::0}    Rehab Potential (if transferring to Rehab): {Prognosis:7953920134:::0}    Recommended Labs or Other Treatments After Discharge: ***    Physician Certification: I certify the above information and transfer of Varsha Mccallum  is necessary for the continuing treatment of the diagnosis listed and that she requires {Admit to Appropriate Level of Care:44475:::0} for {GREATER/LESS:659914411} 30 days.      Update Admission H&P: {CHP DME Changes in HandP:943307375:::0}    PHYSICIAN SIGNATURE:  Electronically signed by Tayo Whitaker MD on 1/17/21 at 10:45 AM EST

## 2021-01-17 NOTE — PROGRESS NOTES
POD 3 posterior cervical laminectomy and fusion  Afebrile  Vitals stable  Incision clean and dry  Doing much better today, using tylenol for pain  Drain 10, 15  Strength 5/5  Tolerating po  uop good    D/C drain  D/C antibiotic    Patient can go home today if home health is secured. Use tylenol at home for pain, rx for tramadol if episode of severe pain.  at bedside. Answered questions, in agreement.

## 2021-01-20 NOTE — DISCHARGE SUMMARY
Date of admission 1/14/21  Date of discharge 1/17/21    Diagnosis cervical spondylosis with myelopathy    Procedure posterior cervical laminectomy and fusion    Patient was admitated on 1/14/21 and taken to the operating room, underwent a posterior cervical laminectomy of C4-6 with posterior fusion, tolerated the procedure without complication and after a period of observation was transferred to the floor. On POD 1 she was afebrile with stable vitals, drain working and villar removed. Notes improvement in upper extremity function. Incision was clean and collar in place, started PT. On POD 2 she was afebrile with stable vitals, ambulating to bathroom with walker and PT. Somewhat confused, pain meds decreased. Antibiotics continued. On 1/17/21 she was doing well, only tylenol for pain, uop good, tolerating po, incision clean and dry, strength normal, drain removed, patient discharged to home in stable condition, prescriptions written, medications reconciled, diet and activity restriction discussed, followup appointments made, answered her questions and in agreement.

## 2021-02-12 ENCOUNTER — HOSPITAL ENCOUNTER (OUTPATIENT)
Dept: GENERAL RADIOLOGY | Age: 76
Discharge: HOME | End: 2021-02-12
Payer: MEDICARE

## 2021-02-12 ENCOUNTER — HOSPITAL ENCOUNTER (OUTPATIENT)
Age: 76
Discharge: HOME | End: 2021-02-12
Payer: MEDICARE

## 2021-02-12 DIAGNOSIS — M50.021 CERVICAL DISC DISORDER AT C4-C5 LEVEL WITH MYELOPATHY: ICD-10-CM

## 2021-02-12 PROCEDURE — 72040 X-RAY EXAM NECK SPINE 2-3 VW: CPT

## (undated) DEVICE — SUTURE ETHLN SZ 3-0 L18IN NONABSORBABLE BLK FS-1 L24MM 3/8 663H

## (undated) DEVICE — COVER LT HNDL BLU PLAS

## (undated) DEVICE — Z DISCONTINUED BY MEDLINE USE 2711682 TRAY SKIN PREP DRY W/ PREM GLV

## (undated) DEVICE — SUTURE VCRL SZ 0 L18IN ABSRB UD L36MM CT-1 1/2 CIR J840D

## (undated) DEVICE — GAUZE,SPONGE,4"X4",16PLY,XRAY,STRL,LF: Brand: MEDLINE

## (undated) DEVICE — TOOL 14MH30 LEGEND 14CM 3MM: Brand: MIDAS REX ™

## (undated) DEVICE — SHEET, T, LAPAROTOMY, STERILE: Brand: MEDLINE

## (undated) DEVICE — TOWEL,OR,DSP,ST,BLUE,STD,4/PK,20PK/CS: Brand: MEDLINE

## (undated) DEVICE — FLOSEAL HEMOSTATIC MATRIX, 10ML: Brand: FLOSEAL HEMOSTATIC MATRIX

## (undated) DEVICE — SUTURE VCRL SZ 0 L27IN ABSRB UD L36MM CT-1 1/2 CIR J260H

## (undated) DEVICE — SOLUTION PREP POVIDONE IOD FOR SKIN MUCOUS MEM PRIOR TO

## (undated) DEVICE — SUTURE VCRL SZ 2-0 L18IN ABSRB UD CT-1 L36MM 1/2 CIR J839D

## (undated) DEVICE — SYRINGE IRRIG 60ML SFT PLIABLE BLB EZ TO GRP 1 HND USE W/

## (undated) DEVICE — GOWN,AURORA,NONREINF,RAGLAN,XXL,STERILE: Brand: MEDLINE

## (undated) DEVICE — SOLUTION SCRB 4OZ 10% POVIDONE IOD ANTIMIC BTL

## (undated) DEVICE — Device

## (undated) DEVICE — MERCY HEALTH WEST TURNOVER: Brand: MEDLINE INDUSTRIES, INC.

## (undated) DEVICE — CANISTER, RIGID, 1200CC: Brand: MEDLINE INDUSTRIES, INC.

## (undated) DEVICE — CODMAN® SURGICAL PATTIES 1/2" X 3" (1.27CM X 7.62CM): Brand: CODMAN®

## (undated) DEVICE — FLOSEAL HEMOSTATIC MATRIX, 10 ML: Brand: FLOSEAL

## (undated) DEVICE — SURGICAL SUCTION CONNECTING TUBE WITH MALE CONNECTOR AND SUCTION CLAMP, 2 FT. LONG (.6 M), 5 MM I.D.: Brand: CONMED

## (undated) DEVICE — SYRINGE MED 30ML STD CLR PLAS LUERLOCK TIP N CTRL DISP

## (undated) DEVICE — PAD,NON-ADHERENT,3X8,STERILE,LF,1/PK: Brand: MEDLINE

## (undated) DEVICE — ELECTRODE PT RET AD L9FT HI MOIST COND ADH HYDRGEL CORDED

## (undated) DEVICE — DRAPE C ARM UNIV W41XL74IN CLR PLAS XR VELC CLSR POLY STRP

## (undated) DEVICE — 1016 S-DRAPE IRRIG POUCH 10/BOX: Brand: STERI-DRAPE™

## (undated) DEVICE — TUBING, SUCTION, 1/4" X 12', STRAIGHT: Brand: MEDLINE

## (undated) DEVICE — UNDERGLOVE SURG SZ 8 FNGR THK0.21MIL GRN LTX BEAD CUF

## (undated) DEVICE — SOLUTION IV IRRIG POUR BRL 0.9% SODIUM CHL 2F7124

## (undated) DEVICE — SYRINGE 20ML LL S/C 50

## (undated) DEVICE — 3M™ IOBAN™ 2 ANTIMICROBIAL INCISE DRAPE 6650EZ: Brand: IOBAN™ 2

## (undated) DEVICE — GLOVE SURG SZ 8 CRM LTX FREE POLYISOPRENE POLYMER BEAD ANTI

## (undated) DEVICE — GOWN SIRUS NONREIN LG W/TWL: Brand: MEDLINE INDUSTRIES, INC.